# Patient Record
Sex: MALE | Race: WHITE | NOT HISPANIC OR LATINO | Employment: FULL TIME | URBAN - METROPOLITAN AREA
[De-identification: names, ages, dates, MRNs, and addresses within clinical notes are randomized per-mention and may not be internally consistent; named-entity substitution may affect disease eponyms.]

---

## 2018-06-26 ENCOUNTER — EVALUATION (OUTPATIENT)
Dept: PHYSICAL THERAPY | Facility: CLINIC | Age: 53
End: 2018-06-26
Payer: COMMERCIAL

## 2018-06-26 VITALS — SYSTOLIC BLOOD PRESSURE: 120 MMHG | DIASTOLIC BLOOD PRESSURE: 80 MMHG

## 2018-06-26 DIAGNOSIS — S92.002D CLOSED DISPLACED FRACTURE OF LEFT CALCANEUS WITH ROUTINE HEALING, UNSPECIFIED PORTION OF CALCANEUS, SUBSEQUENT ENCOUNTER: Primary | ICD-10-CM

## 2018-06-26 PROCEDURE — 97162 PT EVAL MOD COMPLEX 30 MIN: CPT | Performed by: PHYSICAL THERAPIST

## 2018-06-26 PROCEDURE — G8990 OTHER PT/OT CURRENT STATUS: HCPCS | Performed by: PHYSICAL THERAPIST

## 2018-06-26 PROCEDURE — G8991 OTHER PT/OT GOAL STATUS: HCPCS | Performed by: PHYSICAL THERAPIST

## 2018-06-26 PROCEDURE — 97535 SELF CARE MNGMENT TRAINING: CPT | Performed by: PHYSICAL THERAPIST

## 2018-06-26 NOTE — LETTER
2018    Carly Cowan, [address]    Patient: Lennox Martinez   YOB: 1965   Date of Visit: 2018     Encounter Diagnosis     ICD-10-CM    1  Closed displaced fracture of left calcaneus with routine healing, unspecified portion of calcaneus, subsequent encounter S92 002D        Dear Dr Scot Rodrigues:    Please review the attached Plan of Care from  [address] recent visit  Please verify that you agree therapy should continue by signing the attached document and sending it back to our office  If you have any questions or concerns, please don't hesitate to call  Sincerely,    Paty Verma, PT      Referring Provider:      I certify that I have read the below Plan of Care and certify the need for these services furnished under this plan of treatment while under my care                      Carly Cowan DPM  [address] [place] [address]  VIA Facsimile: [phone]          PT Evaluation     Today's date: 2018  Patient name: Lennox Martinez  : 1965  MRN: [id number]  Referring provider: Sreekanth Reese DPM  Dx:   Encounter Diagnosis     ICD-10-CM    1  Closed displaced fracture of left calcaneus with routine healing, unspecified portion of calcaneus, subsequent encounter S92 002D                   Assessment  Impairments: abnormal gait, abnormal or restricted ROM, activity intolerance, impaired balance, pain with function and weight-bearing intolerance  Functional limitations: currently ambulating in CAM boot L foot with b/l axillary crutches WBAT (began WBAT yesterday per MD instruction); poor tolernace to prolonged standing, currently on short term disability from work  Assessment details: Lennox Martinez is a 46 y o  male who presents s/p Closed displaced fracture of left calcaneus with routine healing, unspecified portion of calcaneus, subsequent encounter  (primary encounter diagnosis)  No further referral appears necessary at this time based upon examination results  Patient has several functional deficits as noted above and wishes to be able to return to PLOF to be able to RTW without restriction  Patient is very  Motivated to make full recovery  Prognosis is good given HEP compliance and PT 2x/wk  Please contact me if you have any questions or recommendations  Thank you for the opportunity to share in  Babita Shannon 's care  Barriers to therapy: Limitations with insurance coverage (limited to 20 visits/year)  Understanding of Dx/Px/POC: good   Prognosis: good    Goals  STGs  1  In 4-6 weeks, patient will demonstrate improved ankle ROM of 5-10*  2  In 4-6 weeks, patient will demonstrated decrease in edema of LLE 1cm or more  3  In 4-6 weeks, patient will be WBAT through LLE in [facility]  LTGs  1  In 6-12 weeks, patient will be independent with ambulation on level surfaces and stairs  2  In 6-12 weeks, patient will be independent with HEP and demonstrate readiness for DC from PT  3  In 10-12 weeks, patient will be able to tolerate standing for 3+ hours and able to RTW modified hours/modified time in standing  Plan  Patient would benefit from: skilled physical therapy  Referral necessary: No  Planned modality interventions: unattended electrical stimulation and cryotherapy  Planned therapy interventions: manual therapy, joint mobilization, balance/weight bearing training, home exercise program, therapeutic exercise, stretching, strengthening and patient education  Frequency: 2x week  Duration in weeks: 6  Plan of Care beginning date: 6/26/2018  Plan of Care expiration date: 8/7/2018  Treatment plan discussed with: patient        Subjective Evaluation    History of Present Illness  Date of onset: 1/17/2018  Date of surgery: 1/23/2018  Mechanism of injury: trauma  Mechanism of injury: Patient reports injury occurred at home when climbing a ladder to remove snow from his roof  Patient reports the ladder slipped out from under him and he fell from the top of the ladder (roof of one story home) onto patio  Patient fractured ankle (lateral malleolus) and fractured calcaneus in 3 places per patient  Patient had surgery on 18  Patient was discharged home after 2 days, was ordered to be in bed for approx 2 months, soft cast for first 3 months and then hard cast for 6 weeks  Patient was then placed in a CAM boot, NW until yesterday (18)  Not a recurrent problem   Pain  Current pain rating: 3  At best pain ratin  At worst pain ratin  Location: L foot and ankle up to mid-shin  Quality: dull ache, sharp and burning  Relieving factors: rest  Aggravating factors: standing and walking    Social Support  Steps to enter house: yes (4 ANISA)  Stairs in house: yes (steps to basement)   Lives 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)  Lives with: spouse and adult children    Employment status: not working (currently employed [employer] full time () - currently on STD will be starting LTD end ;  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))  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, modern jazz)    Patient Goals  Patient goal: to get back to work        Objective     Observations   Left Ankle/Foot   Positive for incision  Additional Observation Details  Scabbing just lateral to lateral malleolus, otherwise incision has healed; no drainage noted    Neurological Testing     Sensation     Ankle/Foot   Left Ankle/Foot   Diminished: light touch  Absent: light touch    Comments   Left light touch: L foot, plantar surface of calcaneus and lateral surface of calcaneus       Active Range of Motion   Left Ankle/Foot   Dorsiflexion (ke): 2 degrees   Plantar flexion: 20 degrees     Strength/Myotome Testing     Left Hip   Planes of Motion   Extension: 4+  Abduction: 4+    Right Hip   Planes of Motion   Extension: 4+  Abduction: 4+    Left Knee   Flexion: 5  Extension: 5    Right Knee   Flexion: 5  Extension: 5    Left Ankle/Foot   Dorsiflexion: 4  Plantar flexion: 4  Inversion: 4  Eversion: 4    Additional Strength Details  MMT in NWB at IE    Swelling   Left Ankle/Foot   Metatarsal heads: 24 5 cm  Figure 8: 54 5 cm  Malleoli: 27 5 cm    Right Ankle/Foot   Metatarsal heads: 23 5 cm  Figure 8: 53 cm  Malleoli: 25 cm    Ambulation   Weight-Bearing Status   Weight-Bearing Status (Left): weight-bearing as tolerated   in CAM  Assistive device used: crutches    General Comments     Ankle/Foot Comments   Moderate pitting edema along lateral aspect of L ankle and foot - provided with light compression sleeve and instructed to wear daily but to remove for sleep      Flowsheet Rows      Most Recent Value   PT/OT G-Codes   Current Score  28   Projected Score  53   FOTO information reviewed  Yes   Assessment Type  Evaluation   G code set  Other PT/OT Primary   Other PT Primary Current Status ()  CL   Other PT Primary Goal Status ()  CK              Daily Treatment Diary   Precautions: s/p calcaneal fracture, WBAT LLE, wean from CAM, DOS 1/23/18    Manual  6/26            L ankle PROM to devi                                                                     Exercise Diary  6/26            HEP instruction (gastroc towel stretch, TB PF/DF, HS stretch) 15'            NuStep             Biodex weight shifting             Biodex maze             Biodex LOS             SLR x 4             Isometric ankle inv/ev             TB ankle PF/DF             Seated HR/TR             Mini squats                                                                                                                                                   Modalities  6/26            CP/stim post tx prn                                         Patient was provided a home exercise program and demonstrated an understanding of exercises    Patient was advised to stop performing home exercise program if symptoms increase or new complaints developed  Verbal understanding demonstrated regarding home exercise program instructions

## 2018-06-26 NOTE — PROGRESS NOTES
PT Evaluation     Today's date: 2018  Patient name: Landis Klinefelter  : 1965  MRN: [id number]  Referring provider: Sergio Hi DPM  Dx:   Encounter Diagnosis     ICD-10-CM    1  Closed displaced fracture of left calcaneus with routine healing, unspecified portion of calcaneus, subsequent encounter S92 002D                   Assessment  Impairments: abnormal gait, abnormal or restricted ROM, activity intolerance, impaired balance, pain with function and weight-bearing intolerance  Functional limitations: currently ambulating in CAM boot L foot with b/l axillary crutches WBAT (began WBAT yesterday per MD instruction); poor tolernace to prolonged standing, currently on short term disability from work  Assessment details: Landis Klinefelter is a 46 y o  male who presents s/p Closed displaced fracture of left calcaneus with routine healing, unspecified portion of calcaneus, subsequent encounter  (primary encounter diagnosis)  No further referral appears necessary at this time based upon examination results  Patient has several functional deficits as noted above and wishes to be able to return to PLOF to be able to RTW without restriction  Patient is very  Motivated to make full recovery  Prognosis is good given HEP compliance and PT 2x/wk  Please contact me if you have any questions or recommendations  Thank you for the opportunity to share in  Yesika Webber 's care  Barriers to therapy: Limitations with insurance coverage (limited to 20 visits/year)  Understanding of Dx/Px/POC: good   Prognosis: good    Goals  STGs  1  In 4-6 weeks, patient will demonstrate improved ankle ROM of 5-10*  2  In 4-6 weeks, patient will demonstrated decrease in edema of LLE 1cm or more  3  In 4-6 weeks, patient will be WBAT through [facility]LE in [facility]  LTGs  1  In 6-12 weeks, patient will be independent with ambulation on level surfaces and stairs  2   In 6-12 weeks, patient will be independent with HEP and demonstrate readiness for DC from PT  3  In 10-12 weeks, patient will be able to tolerate standing for 3+ hours and able to RTW modified hours/modified time in standing  Plan  Patient would benefit from: skilled physical therapy  Referral necessary: No  Planned modality interventions: unattended electrical stimulation and cryotherapy  Planned therapy interventions: manual therapy, joint mobilization, balance/weight bearing training, home exercise program, therapeutic exercise, stretching, strengthening and patient education  Frequency: 2x week  Duration in weeks: 6  Plan of Care beginning date: 2018  Plan of Care expiration date: 2018  Treatment plan discussed with: patient        Subjective Evaluation    History of Present Illness  Date of onset: 2018  Date of surgery: 2018  Mechanism of injury: trauma  Mechanism of injury: Patient reports injury occurred at home when climbing a ladder to remove snow from his roof  Patient reports the ladder slipped out from under him and he fell from the top of the ladder (roof of one story home) onto patio  Patient fractured ankle (lateral malleolus) and fractured calcaneus in 3 places per patient  Patient had surgery on 18  Patient was discharged home after 2 days, was ordered to be in bed for approx 2 months, soft cast for first 3 months and then hard cast for 6 weeks  Patient was then placed in a CAM boot, NWB until yesterday (18)     Not a recurrent problem   Pain  Current pain rating: 3  At best pain ratin  At worst pain ratin  Location: L foot and ankle up to mid-shin  Quality: dull ache, sharp and burning  Relieving factors: rest  Aggravating factors: standing and walking    Social Support  Steps to enter house: yes (4 ANISA)  Stairs in house: yes (steps to basement)   Lives 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)  Lives with: spouse and adult children    Employment status: not working (currently employed [employer] full time () - currently on STD will be starting LTD end of July;  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))  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, modern jazz)    Patient Goals  Patient goal: to get back to work        Objective     Observations   Left Ankle/Foot   Positive for incision  Additional Observation Details  Scabbing just lateral to lateral malleolus, otherwise incision has healed; no drainage noted    Neurological Testing     Sensation     Ankle/Foot   Left Ankle/Foot   Diminished: light touch  Absent: light touch    Comments   Left light touch: L foot, plantar surface of calcaneus and lateral surface of calcaneus       Active Range of Motion   Left Ankle/Foot   Dorsiflexion (ke): 2 degrees   Plantar flexion: 20 degrees     Strength/Myotome Testing     Left Hip   Planes of Motion   Extension: 4+  Abduction: 4+    Right Hip   Planes of Motion   Extension: 4+  Abduction: 4+    Left Knee   Flexion: 5  Extension: 5    Right Knee   Flexion: 5  Extension: 5    Left Ankle/Foot   Dorsiflexion: 4  Plantar flexion: 4  Inversion: 4  Eversion: 4    Additional Strength Details  MMT in NWB at IE    Swelling   Left Ankle/Foot   Metatarsal heads: 24 5 cm  Figure 8: 54 5 cm  Malleoli: 27 5 cm    Right Ankle/Foot   Metatarsal heads: 23 5 cm  Figure 8: 53 cm  Malleoli: 25 cm    Ambulation   Weight-Bearing Status   Weight-Bearing Status (Left): weight-bearing as tolerated   in CAM  Assistive device used: crutches    General Comments     Ankle/Foot Comments   Moderate pitting edema along lateral aspect of L ankle and foot - provided with light compression sleeve and instructed to wear daily but to remove for sleep      Flowsheet Rows      Most Recent Value   PT/OT G-Codes   Current Score  28   Projected Score  53   FOTO information reviewed  Yes   Assessment Type  Evaluation   G code set  Other PT/OT Primary   Other PT Primary Current Status ()  CL   Other PT Primary Goal Status ()  CK              Daily Treatment Diary   Precautions: s/p calcaneal fracture, WBAT LLE, wean from CAM, DOS 1/23/18    Manual  6/26            L ankle PROM to devi                                                                     Exercise Diary  6/26            HEP instruction (gastroc towel stretch, TB PF/DF, HS stretch) 15'            NuStep             Biodex weight shifting             Biodex maze             Biodex LOS             SLR x 4             Isometric ankle inv/ev             TB ankle PF/DF             Seated HR/TR             Mini squats                                                                                                                                                   Modalities  6/26            CP/stim post tx prn                                         Patient was provided a home exercise program and demonstrated an understanding of exercises  Patient was advised to stop performing home exercise program if symptoms increase or new complaints developed  Verbal understanding demonstrated regarding home exercise program instructions

## 2018-06-28 ENCOUNTER — OFFICE VISIT (OUTPATIENT)
Dept: PHYSICAL THERAPY | Facility: CLINIC | Age: 53
End: 2018-06-28
Payer: COMMERCIAL

## 2018-06-28 DIAGNOSIS — S92.002D CLOSED DISPLACED FRACTURE OF LEFT CALCANEUS WITH ROUTINE HEALING, UNSPECIFIED PORTION OF CALCANEUS, SUBSEQUENT ENCOUNTER: Primary | ICD-10-CM

## 2018-06-28 PROCEDURE — 97110 THERAPEUTIC EXERCISES: CPT | Performed by: PHYSICAL THERAPIST

## 2018-06-28 PROCEDURE — 97140 MANUAL THERAPY 1/> REGIONS: CPT | Performed by: PHYSICAL THERAPIST

## 2018-06-28 NOTE — PROGRESS NOTES
Daily Note     Today's date: 2018  Patient name: Sheila Lowry  : 1965  MRN: [id number]  Referring provider: Robert Lagos DPM  Dx:   Encounter Diagnosis     ICD-10-CM    1  Closed displaced fracture of left calcaneus with routine healing, unspecified portion of calcaneus, subsequent encounter S92 002D                   Subjective: "I was actually feeling a little better after you gave me some exercises but I'm holding back because I don't want to over-do it "      Objective: See treatment diary below  Daily Treatment Diary   Precautions: s/p calcaneal fracture, WBAT LLE, wean from CAM, DOS 18    Manual        L ankle PROM to devi  15'                                          Exercise Diary        HEP instruction (gastroc towel stretch, TB PF/DF, HS stretch) 15'       NuStep  L1, 10'      Biodex weight shifting  10"x10, // bars in CAM boot      Biodex maze        Biodex LOS        SLR x 4  2x10 ea      Isometric ankle inv/ev  5" 10x ea      TB ankle PF/DF        Seated HR/TR  20x ea      Mini squats  2x10, 5" ea                                                                                          Modalities        CP/stim post tx prn  Deferred to home                            Assessment: Tolerated treatment well  All standing exercises were performed with CAM boot on  Recommended patient bring sneaker for NV to begin WB out of CAM to tolerance  Patient demonstrated fatigue post treatment, exhibited good technique with therapeutic exercises and would benefit from continued PT      Plan: Continue per plan of care  Progress treatment as tolerated

## 2018-07-03 ENCOUNTER — OFFICE VISIT (OUTPATIENT)
Dept: PHYSICAL THERAPY | Facility: CLINIC | Age: 53
End: 2018-07-03
Payer: COMMERCIAL

## 2018-07-03 DIAGNOSIS — S92.002D CLOSED DISPLACED FRACTURE OF LEFT CALCANEUS WITH ROUTINE HEALING, UNSPECIFIED PORTION OF CALCANEUS, SUBSEQUENT ENCOUNTER: Primary | ICD-10-CM

## 2018-07-03 PROCEDURE — 97116 GAIT TRAINING THERAPY: CPT

## 2018-07-03 PROCEDURE — 97140 MANUAL THERAPY 1/> REGIONS: CPT

## 2018-07-03 PROCEDURE — 97110 THERAPEUTIC EXERCISES: CPT

## 2018-07-03 NOTE — PROGRESS NOTES
Daily Note     Today's date: 7/3/2018  Patient name: Sheila Lowry  : 1965  MRN: [id number]  Referring provider: Robert Lagos DPM  Dx:   Encounter Diagnosis     ICD-10-CM    1  Closed displaced fracture of left calcaneus with routine healing, unspecified portion of calcaneus, subsequent encounter S92 002D                   Subjective: Pt reports that he is doing his HEP at home  He reports his ankle is "sore but there is no pain "      Objective: See treatment diary below  Daily Treatment Diary   Precautions: s/p calcaneal fracture, WBAT LLE, wean from CAM, DOS 18    Manual   7/3     L ankle PROM to devi  15' 15'                                         Exercise Diary  6/26 6/28 7/3     HEP instruction (gastroc towel stretch, TB PF/DF, HS stretch) 15'       NuStep  L1, 10' L1 10'     Biodex weight shifting  10"x10, // bars in CAM boot 10" on/10" off in Cam boot     Biodex maze        Biodex LOS        SLR x 4  2x10 ea 2x10     Isometric ankle inv/ev  5" 10x ea 5" 10x     TB ankle PF/DF   Green 2x10      Seated HR/TR  20x ea 20x ea     Mini squats  2x10, 5" ea 2x10 5" ea     Towel slides Iv/Ev   1' x1 ea      Gait training    Bilateral Axillary crutches, no boot 150'x2  Single axillary crutch 50'x1                                                                         Modalities   7/3     CP/stim post tx prn  Deferred to home NP                         Assessment: Tolerated treatment well  Initiated towel slides and TB exercises this date with no exacerbation of pain  Gait training performed without CAM boot this date  B/L Axillary crutches 150'x2 and a Single axillary crutch 50'x1  Patient demonstrated fatigue post treatment and would benefit from continued PT      Plan: Continue per plan of care  Progress treatment as tolerated  Wean off CAM bootANIBAL CAM boot next week

## 2018-07-05 ENCOUNTER — OFFICE VISIT (OUTPATIENT)
Dept: PHYSICAL THERAPY | Facility: CLINIC | Age: 53
End: 2018-07-05
Payer: COMMERCIAL

## 2018-07-05 DIAGNOSIS — S92.002D CLOSED DISPLACED FRACTURE OF LEFT CALCANEUS WITH ROUTINE HEALING, UNSPECIFIED PORTION OF CALCANEUS, SUBSEQUENT ENCOUNTER: Primary | ICD-10-CM

## 2018-07-05 PROCEDURE — 97116 GAIT TRAINING THERAPY: CPT

## 2018-07-05 PROCEDURE — 97110 THERAPEUTIC EXERCISES: CPT

## 2018-07-05 PROCEDURE — 97140 MANUAL THERAPY 1/> REGIONS: CPT

## 2018-07-05 NOTE — PROGRESS NOTES
Daily Note     Today's date: 2018  Patient name: Gregorio Jara  : 1965  MRN: [id number]  Referring provider: Tiff Driver DPM  Dx:   Encounter Diagnosis     ICD-10-CM    1  Closed displaced fracture of left calcaneus with routine healing, unspecified portion of calcaneus, subsequent encounter S92 002D                   Subjective: Pt reports that he had no pain after walking without the CAM boot  Pt denies pain now      Objective: See treatment diary below    Precautions: s/p calcaneal fracture, WBAT LLE, wean from CAM, DOS 18    Manual  6/26 6/28 7/3 7/5     L ankle PROM to devi  15' 15' 15'                                        Exercise Diary  6/26 6/28 7/3 7/5    HEP instruction (gastroc towel stretch, TB PF/DF, HS stretch) 15'       NuStep  L1, 10' L1 10' L3 10' no boot    Biodex weight shifting  10"x10, // bars in CAM boot 10" on/10" off in Cam boot 10" on/10" off in Cam boot    Biodex maze        Biodex LOS        SLR x 4  2x10 ea 2x10 2x10    Isometric ankle inv/ev  5" 10x ea 5" 10x 5" x10    TB ankle PF/DF   Green 2x10  Green 3x10     Seated HR/TR  20x ea 20x ea 20x    Mini squats  2x10, 5" ea 2x10 5" ea 2x10 5"    Towel slides Iv/Ev   1' x1 ea  1' x1    Gait training    Bilateral Axillary crutches, no boot 150'x2  Single axillary crutch 50'x1 Bilateral axillary crutches 300' x1 with no CAM boot  Modalities  6/26 6/28 7/3 7/5     CP/stim post tx prn  Deferred to home NP NP                      Assessment: Tolerated treatment well  Pt is ambulating without the CAM boot during exercises  Patient demonstrated fatigue post treatment, exhibited good technique with therapeutic exercises and would benefit from continued PT  CAM boot D/C for household ambulation with bilateral axillary crutches  Plan: Continue per plan of care  Progress treatment as tolerated

## 2018-07-10 ENCOUNTER — OFFICE VISIT (OUTPATIENT)
Dept: PHYSICAL THERAPY | Facility: CLINIC | Age: 53
End: 2018-07-10
Payer: COMMERCIAL

## 2018-07-10 DIAGNOSIS — S92.002D CLOSED DISPLACED FRACTURE OF LEFT CALCANEUS WITH ROUTINE HEALING, UNSPECIFIED PORTION OF CALCANEUS, SUBSEQUENT ENCOUNTER: Primary | ICD-10-CM

## 2018-07-10 PROCEDURE — 97110 THERAPEUTIC EXERCISES: CPT | Performed by: PHYSICAL THERAPIST

## 2018-07-10 PROCEDURE — 97140 MANUAL THERAPY 1/> REGIONS: CPT | Performed by: PHYSICAL THERAPIST

## 2018-07-10 NOTE — PROGRESS NOTES
Daily Note     Today's date: 7/10/2018  Patient name: Humphrey Reis  : 1965  MRN: [id number]  Referring provider: Mykel Gu DPM  Dx:   Encounter Diagnosis     ICD-10-CM    1  Closed displaced fracture of left calcaneus with routine healing, unspecified portion of calcaneus, subsequent encounter S92 002D                   Subjective: "I'm doing pretty good  I use one crutch around the house all the time  I only wear the boot when I go to a place like [facility] where I don't want people to run into me "      Objective: See treatment diary below  Precautions: s/p calcaneal fracture, WBAT LLE, wean from CAM, DOS 18    Manual  6/26 6/28 7/3 7/5  7/10   L ankle PROM to devi  15' 15' 15' 15'                                       Exercise Diary  6/26 6/28 7/3 7/5 7/10   HEP instruction (gastroc towel stretch, TB PF/DF, HS stretch) 15'       NuStep  L1, 10' L1 10' L3 10' no boot L4 10' sneaker   Biodex weight shifting  10"x10, // bars in CAM boot 10" on/10" off in Cam boot 10" on/10" off in Cam boot 10" on/off in sneaker, 3 mins   Biodex maze     Medium, static, 3x   Biodex LOS        SLR x 4  2x10 ea 2x10 2x10 3x10 ea, 1#   Isometric ankle inv/ev  5" 10x ea 5" 10x 5" x10    TB ankle PF/DF   Green 2x10  Green 3x10  Green 3x10   Seated HR/TR  20x ea 20x ea 20x Standing 20x ea   Mini squats  2x10, 5" ea 2x10 5" ea 2x10 5" 3x10, 5" on Biodex for equal WB   Towel slides Iv/Ev   1' x1 ea  1' x1    Gait training    Bilateral Axillary crutches, no boot 150'x2  Single axillary crutch 50'x1 Bilateral axillary crutches 300' x1 with no CAM boot  One crutch, sneaker, 450 feet                                                                       Modalities  6/26 6/28 7/3 7/5  7/10   CP/stim post tx prn  Deferred to home NP NP Deferred to home                       Assessment: Tolerated treatment well  Performed entire treatment without CAM boot today  Tolerated progressions well   Progressed gait training from 2 crutches to 1 crutch with good tolerance  Reported some anterior ankle pain post gait training  Patient demonstrated fatigue post treatment, exhibited good technique with therapeutic exercises and would benefit from continued PT      Plan: Continue per plan of care  Progress treatment as tolerated

## 2018-07-13 ENCOUNTER — OFFICE VISIT (OUTPATIENT)
Dept: PHYSICAL THERAPY | Facility: CLINIC | Age: 53
End: 2018-07-13
Payer: COMMERCIAL

## 2018-07-13 DIAGNOSIS — S92.002D CLOSED DISPLACED FRACTURE OF LEFT CALCANEUS WITH ROUTINE HEALING, UNSPECIFIED PORTION OF CALCANEUS, SUBSEQUENT ENCOUNTER: Primary | ICD-10-CM

## 2018-07-13 PROCEDURE — 97140 MANUAL THERAPY 1/> REGIONS: CPT | Performed by: PHYSICAL THERAPIST

## 2018-07-13 PROCEDURE — 97110 THERAPEUTIC EXERCISES: CPT | Performed by: PHYSICAL THERAPIST

## 2018-07-13 NOTE — PROGRESS NOTES
Daily Note     Today's date: 2018  Patient name: Chante Hampton  : 1965  MRN: [id number]  Referring provider: Neris Bernardo DPM  Dx:   Encounter Diagnosis     ICD-10-CM    1  Closed displaced fracture of left calcaneus with routine healing, unspecified portion of calcaneus, subsequent encounter S92 002D                   Subjective: "I did the college orientation over the last two days with my daughter  Not really too sore "      Objective: See treatment diary below  Precautions: s/p calcaneal fracture, WBAT LLE, wean from CAM, DOS 18    Manual  7/13 6/28 7/3 7/5  7/10   L ankle PROM to devi  15' 15' 15' 15'                                       Exercise Diary  7/13 6/28 7/3 7/5 7/10   HEP instruction (gastroc towel stretch, TB PF/DF, HS stretch)        NuStep L4 10' sneaker L1, 10' L1 10' L3 10' no boot L4 10' sneaker   Biodex weight shifting 10" on/off in sneaker, 3 mins 10"x10, // bars in CAM boot 10" on/10" off in Cam boot 10" on/10" off in Cam  10" on/off in sneaker, 3 mins   Biodex maze Medium, static, 3x    Medium, static, 3x   Biodex LOS        SLR x 4 3x10 ea, 1# 2x10 ea 2x10 2x10 3x10 ea, 1#   Isometric ankle inv/ev DC to TB 5" 10x ea 5" 10x 5" x10    TB ankle PF/DF 4 way  Green  3x10  Green 2x10  Green 3x10  Green 3x10   Seated HR/TR Standing 20x ea 20x ea 20x ea 20x Standing 20x ea   Mini squats 3x10, 5" in sneaker 2x10, 5" ea 2x10 5" ea 2x10 5" 3x10, 5" on Biodex for equal WB   Towel slides Iv/Ev DC  1' x1 ea  1' x1    Gait training    Bilateral Axillary crutches, no boot 150'x2  Single axillary crutch 50'x1 Bilateral axillary crutches 300' x1 with no CAM boot  One crutch, sneaker, 450 feet   Standing hip 3 way 10x ea                                                                   Modalities  7/13 6/28 7/3 7/5  7/10   CP/stim post tx prn  Deferred to home NP NP Deferred to home                         Assessment: Tolerated treatment well  Tolerance to WB is improving   Added standing hip 3 way exercise without complaints  Patient demonstrated fatigue post treatment, exhibited good technique with therapeutic exercises and would benefit from continued PT      Plan: Continue per plan of care  Progress treatment as tolerated

## 2018-07-17 ENCOUNTER — OFFICE VISIT (OUTPATIENT)
Dept: PHYSICAL THERAPY | Facility: CLINIC | Age: 53
End: 2018-07-17
Payer: COMMERCIAL

## 2018-07-17 DIAGNOSIS — S92.002D CLOSED DISPLACED FRACTURE OF LEFT CALCANEUS WITH ROUTINE HEALING, UNSPECIFIED PORTION OF CALCANEUS, SUBSEQUENT ENCOUNTER: Primary | ICD-10-CM

## 2018-07-17 PROCEDURE — 97140 MANUAL THERAPY 1/> REGIONS: CPT

## 2018-07-17 PROCEDURE — 97110 THERAPEUTIC EXERCISES: CPT

## 2018-07-17 PROCEDURE — 97116 GAIT TRAINING THERAPY: CPT

## 2018-07-17 NOTE — PROGRESS NOTES
Daily Note     Today's date: 2018  Patient name: Chante Hampton  : 1965  MRN: [id number]  Referring provider: Neris Bernardo DPM  Dx:   Encounter Diagnosis     ICD-10-CM    1  Closed displaced fracture of left calcaneus with routine healing, unspecified portion of calcaneus, subsequent encounter S92 002D                   Subjective: Pt reports that he is walking at home with one crutch 50% of the time  Objective: See treatment diary below  Precautions: s/p calcaneal fracture, WBAT LLE, wean from CAM, DOS 18    Manual  7/13 7/17 7/3 7/5  7/10   L ankle PROM to devi  15' 15' 15' 15'                                       Exercise Diary  7/13 7/17 7/3 7/5 7/10   HEP instruction (gastroc towel stretch, TB PF/DF, HS stretch)        NuStep L4 10' sneaker L5, 10' L1 10' L3 10' no boot L4 10' sneaker   Biodex weight shifting 10" on/off in sneaker, 3 mins 10" on/off in sneaker, 3 mins 10" on/10" off in Cam boot 10" on/10" off in Cam  10" on/off in sneaker, 3 mins   Biodex maze Medium, static, 3x Medium, L12, 3x   Medium, static, 3x   Biodex LOS  Medium, L12, 3x      SLR x 4 3x10 ea, 1# 3x10 ea, 1 5# 2x10 2x10 3x10 ea, 1#   Isometric ankle inv/ev DC to TB  5" 10x 5" x10    TB ankle PF/DF 4 way  Green  3x10 4 way  Green  3x10 Green 2x10  Green 3x10  Green 3x10   Seated HR/TR Standing 20x ea Standing 30x ea 20x ea 20x Standing 20x ea   Mini squats 3x10, 5" in sneaker 3x10, 5" in sneaker 2x10 5" ea 2x10 5" 3x10, 5" on Biodex for equal WB   Towel slides Iv/Ev DC  1' x1 ea  1' x1    Gait training   One crutch, sneaker, >500 feet Bilateral Axillary crutches, no boot 150'x2  Single axillary crutch 50'x1 Bilateral axillary crutches 300' x1 with no CAM boot   One crutch, sneaker, 450 feet   Standing hip 3 way 10x ea 2x10 ea                                                                  Modalities  7/13 7/17 7/3 7/5  7/10   CP/stim post tx prn  Deferred to home NP NP Deferred to home Assessment: Tolerated treatment well  Increased reps and weight on multiple exercises  Pt able to ambulate with single axillary crutch with no LOB  Pt sent home with instructions to use single axillary crutch as much as he can  Patient exhibited good technique with therapeutic exercises and would benefit from continued PT      Plan: Continue per plan of care  Progress treatment as tolerated

## 2018-07-19 ENCOUNTER — OFFICE VISIT (OUTPATIENT)
Dept: PHYSICAL THERAPY | Facility: CLINIC | Age: 53
End: 2018-07-19
Payer: COMMERCIAL

## 2018-07-19 DIAGNOSIS — S92.002D CLOSED DISPLACED FRACTURE OF LEFT CALCANEUS WITH ROUTINE HEALING, UNSPECIFIED PORTION OF CALCANEUS, SUBSEQUENT ENCOUNTER: Primary | ICD-10-CM

## 2018-07-19 PROCEDURE — 97110 THERAPEUTIC EXERCISES: CPT | Performed by: PHYSICAL THERAPIST

## 2018-07-19 PROCEDURE — 97535 SELF CARE MNGMENT TRAINING: CPT | Performed by: PHYSICAL THERAPIST

## 2018-07-19 PROCEDURE — 97140 MANUAL THERAPY 1/> REGIONS: CPT | Performed by: PHYSICAL THERAPIST

## 2018-07-19 NOTE — PROGRESS NOTES
Daily Note     Today's date: 2018  Patient name: Chelsea Rick  : 1965  MRN: [id number]  Referring provider: Steffany Ferguson DPM  Dx:   Encounter Diagnosis     ICD-10-CM    1  Closed displaced fracture of left calcaneus with routine healing, unspecified portion of calcaneus, subsequent encounter S92 002D        Start Time: 0800  Stop Time: 0900  Total time in clinic (min): 60 minutes    Subjective: Pt reports he is a little sore today, but notes that he is able to perform more activities as he is progressing out of the CAM boot  Objective: See treatment diary below  Precautions: s/p calcaneal fracture, WBAT LLE, wean from CAM, DOS 18     Manual  7/13 7/17 7/19 7/5  7/10   L ankle PROM to devi   15' 15' 15' 15'                                                                 Exercise Diary  7/13 7/17 7/19 7/5 7/10   HEP instruction (gastroc towel stretch, TB PF/DF, HS stretch)             NuStep L4 10' sneaker L5, 10' L5 10' L3 10' no boot L4 10' sneaker   Biodex weight shifting 10" on/off in sneaker, 3 mins 10" on/off in sneaker, 3 mins 10" on/off in sneaker  3 mins 10" on/10" off in Cam  10" on/off in sneaker, 3 mins   Biodex maze Medium, static, 3x Medium, L12, 3x Medium  L12, 3x   Medium, static, 3x   Biodex LOS   Medium, L12, 3x Medium  L12, 3x       SLR x 4 3x10 ea, 1# 3x10 ea, 1 5# 3x10 ea 1 5# 2x10 3x10 ea, 1#   Isometric ankle inv/ev DC to TB    5" x10     TB ankle PF/DF 4 way  Green  3x10 4 way  Green  3x10 4 way  Green  3x10 Green 3x10  Green 3x10   Seated HR/TR Standing 20x ea Standing 30x ea Standing 30x ea 20x Standing 20x ea   Mini squats 3x10, 5" in sneaker 3x10, 5" in sneaker 3x10 5" in sneaker 2x10 5" 3x10, 5" on Biodex for equal WB   Towel slides Iv/Ev DC    1' x1     Gait training    One crutch, sneaker, >500 feet One crutch, sneaker  >500 feet Bilateral axillary crutches 300' x1 with no CAM boot   One crutch, sneaker, 450 feet   Standing hip 3 way 10x ea 2x10 ea 2x10 ea                                                                                                               Modalities  7/13 7/17 7/19 7/5  7/10   CP/stim post tx prn   Deferred to home NP NP Deferred to home                                     Assessment: Tolerated treatment well  Patient exhibited good technique with therapeutic exercises and would benefit from continued PT   Pt noted minor fatigue post treatment, however, no increased pain  Pt given additional HEP consisting of HR/TR, SLRs, and mini squats  Plan: Continue per plan of care  Progress treatment as tolerated

## 2018-07-24 ENCOUNTER — OFFICE VISIT (OUTPATIENT)
Dept: PHYSICAL THERAPY | Facility: CLINIC | Age: 53
End: 2018-07-24
Payer: COMMERCIAL

## 2018-07-24 DIAGNOSIS — S92.002D CLOSED DISPLACED FRACTURE OF LEFT CALCANEUS WITH ROUTINE HEALING, UNSPECIFIED PORTION OF CALCANEUS, SUBSEQUENT ENCOUNTER: Primary | ICD-10-CM

## 2018-07-24 PROCEDURE — 97140 MANUAL THERAPY 1/> REGIONS: CPT | Performed by: PHYSICAL THERAPIST

## 2018-07-24 PROCEDURE — 97110 THERAPEUTIC EXERCISES: CPT | Performed by: PHYSICAL THERAPIST

## 2018-07-24 NOTE — PROGRESS NOTES
Daily Note     Today's date: 2018  Patient name: Chante Hampton  : 1965  MRN: [id number]  Referring provider: Neris Bernardo DPM  Dx:   Encounter Diagnosis     ICD-10-CM    1  Closed displaced fracture of left calcaneus with routine healing, unspecified portion of calcaneus, subsequent encounter S92 002D                   Subjective: Patient reports he is doing well  Occasionally ambulates around kitchen without any crutches, but mainly uses one crutch at this time        Objective: See treatment diary below  Precautions: s/p calcaneal fracture, WBAT LLE, wean from CAM, DOS 18     Manual  7/13 7/17 7/19 7/24  7/10   L ankle PROM to devi   15' 15' 15' 15'                                                                 Exercise Diary  7/13 7/17 7/19 7/24 7/10   HEP instruction (gastroc towel stretch, TB PF/DF, HS stretch)             NuStep L4 10' sneaker L5, 10' L5 10' L6, 10' L4 10' sneaker   Biodex weight shifting 10" on/off in sneaker, 3 mins 10" on/off in sneaker, 3 mins 10" on/off in sneaker  3 mins  10" on/off in sneaker, 3 mins   Biodex maze Medium, static, 3x Medium, L12, 3x Medium  L12, 3x  Medium  L11, 3x Medium, static, 3x   Biodex LOS   Medium, L12, 3x Medium  L12, 3x  Medium  L11, 3x     SLR x 4 3x10 ea, 1# 3x10 ea, 1 5# 3x10 ea 1 5# 3x10 ea, 2# 3x10 ea, 1#   TB ankle PF/DF 4 way  Green  3x10 4 way  Green  3x10 4 way  Green  3x10 4 way, blue, 3x10 Green 3x10   HR/TR Standing 20x ea Standing 30x ea Standing 30x ea 30x ea Standing 20x ea   Mini squats 3x10, 5" in sneaker 3x10, 5" in sneaker 3x10 5" in sneaker 3x10 5" 3x10, 5" on Biodex for equal WB   Gait training    One crutch, sneaker, >500 feet One crutch, sneaker  >500 feet On TM, 1 5-2 5 mph, both UE assist on handrails  x5 mins One crutch, sneaker, 450 feet   Standing hip 3 way 10x ea 2x10 ea 2x10 ea  3x10 ea                                                                                                             Modalities   7/17 7/19 7/24 7/10   CP/stim post tx prn   Deferred to home NP NP Deferred to home                                   Assessment: Tolerated treatment well  Making steady progress toward goals  Becoming more tolerant to WB  Initiated GT on TM this date and utilized mirror for visual feedback to avoid lateral lean  Unequal step length noted, cues to equalize length  Patient demonstrated fatigue post treatment, exhibited good technique with therapeutic exercises and would benefit from continued PT      Plan: Continue per plan of care  Progress treatment as tolerated

## 2018-07-26 ENCOUNTER — OFFICE VISIT (OUTPATIENT)
Dept: PHYSICAL THERAPY | Facility: CLINIC | Age: 53
End: 2018-07-26
Payer: COMMERCIAL

## 2018-07-26 DIAGNOSIS — S92.002D CLOSED DISPLACED FRACTURE OF LEFT CALCANEUS WITH ROUTINE HEALING, UNSPECIFIED PORTION OF CALCANEUS, SUBSEQUENT ENCOUNTER: Primary | ICD-10-CM

## 2018-07-26 PROCEDURE — 97116 GAIT TRAINING THERAPY: CPT

## 2018-07-26 PROCEDURE — 97140 MANUAL THERAPY 1/> REGIONS: CPT

## 2018-07-26 PROCEDURE — 97110 THERAPEUTIC EXERCISES: CPT

## 2018-07-26 NOTE — PROGRESS NOTES
Daily Note     Today's date: 2018  Patient name: Jasper Banuelos  : 1965  MRN: [id number]  Referring provider: Nette Islas DPM  Dx:   Encounter Diagnosis     ICD-10-CM    1  Closed displaced fracture of left calcaneus with routine healing, unspecified portion of calcaneus, subsequent encounter S92 002D                   Subjective: Pt denies all pain         Objective: See treatment diary below  Precautions: s/p calcaneal fracture, WBAT LLE, wean from CAM, DOS 18     Manual     L ankle PROM to devi   15' 15' 15' 15'                                                                 Exercise Diary     HEP instruction (gastroc towel stretch, TB PF/DF, HS stretch)             NuStep L4 10' sneaker L5, 10' L5 10' L6, 10' L6, 10'   Biodex weight shifting 10" on/off in sneaker, 3 mins 10" on/off in sneaker, 3 mins 10" on/off in sneaker  3 mins     Biodex maze Medium, static, 3x Medium, L12, 3x Medium  L12, 3x  Medium  L11, 3x Medium  L11, 3x   Biodex LOS   Medium, L12, 3x Medium  L12, 3x  Medium  L11, 3x  Medium  L11, 3x   SLR x 4 3x10 ea, 1# 3x10 ea, 1 5# 3x10 ea 1 5# 3x10 ea, 2# 3x10 ea, 2#   TB ankle PF/DF 4 way  Green  3x10 4 way  Green  3x10 4 way  Green  3x10 4 way, blue, 3x10 4 way, blue, 3x10   HR/TR Standing 20x ea Standing 30x ea Standing 30x ea 30x ea 30x ea   Mini squats 3x10, 5" in sneaker 3x10, 5" in sneaker 3x10 5" in sneaker 3x10 5" 3x10 5"   Gait training    One crutch, sneaker, >500 feet One crutch, sneaker  >500 feet On TM, 1 5-2 5 mph, both UE assist on handrails  x5 mins On TM, 1 5-2 5 mph, both UE assist on handrails  x5 mins   Standing hip 3 way 10x ea 2x10 ea 2x10 ea  3x10 ea  3x10 ea                                                                                                           Modalities  7/13 7/17 7/19 7/24 7/26   CP/stim post tx prn   Deferred to home NP NP NP                                   Assessment: Tolerated treatment well  Pt continues to walk with an antalgic gait  Continued to gait train on Tmill with cueing for proper gait brianne  Patient exhibited good technique with therapeutic exercises and would benefit from continued PT      Plan: Continue per plan of care  Progress treatment as tolerated

## 2018-07-31 ENCOUNTER — OFFICE VISIT (OUTPATIENT)
Dept: PHYSICAL THERAPY | Facility: CLINIC | Age: 53
End: 2018-07-31
Payer: COMMERCIAL

## 2018-07-31 DIAGNOSIS — S92.002D CLOSED DISPLACED FRACTURE OF LEFT CALCANEUS WITH ROUTINE HEALING, UNSPECIFIED PORTION OF CALCANEUS, SUBSEQUENT ENCOUNTER: Primary | ICD-10-CM

## 2018-07-31 PROCEDURE — 97140 MANUAL THERAPY 1/> REGIONS: CPT | Performed by: PHYSICAL THERAPIST

## 2018-07-31 PROCEDURE — 97110 THERAPEUTIC EXERCISES: CPT | Performed by: PHYSICAL THERAPIST

## 2018-07-31 NOTE — PROGRESS NOTES
Daily Note     Today's date: 2018  Patient name: Sarah Beth East  : 1965  MRN: [id number]  Referring provider: Radha Gil DPM  Dx:   Encounter Diagnosis     ICD-10-CM    1  Closed displaced fracture of left calcaneus with routine healing, unspecified portion of calcaneus, subsequent encounter S92 002D                   Subjective: "You're going to be mad at me  Instead of walking on the treadmill I mowed my lawn  But I only did a small section and avoided any areas where it was uneven  I was out for about a half hour " Patient feeling good, reports the worst of his pain is in his heel and along the scar        Objective: See treatment diary below  Precautions: s/p calcaneal fracture, WBAT LLE, wean from CAM, DOS 18     Manual  731    L ankle PROM to devi  15' 15' 15' 15' 15'                                                                 Exercise Diary     HEP instruction (gastroc towel stretch, TB PF/DF, HS stretch)             NuStep L7,10'  L5, 10' L5 10' L6, 10' L6, 10'   Biodex weight shifting DC 10" on/off in sneaker, 3 mins 10" on/off in sneaker  3 mins     Biodex maze Difficult, L10,  3x Medium, L12, 3x Medium  L12, 3x  Medium  L11, 3x Medium  L11, 3x   Biodex LOS  Medium, L10, 3x Medium, L12, 3x Medium  L12, 3x  Medium  L11, 3x  Medium  L11, 3x   SLR x 4 3x10 ea, 2# 3x10 ea, 1 5# 3x10 ea 1 5# 3x10 ea, 2# 3x10 ea, 2#   TB ankle 4 way Blue  3x10  4 way  Green  3x10 4 way  Green  3x10 4 way, blue, 3x10 4 way, blue, 3x10   HR/TR - standing 30x ea Standing 30x ea Standing 30x ea 30x ea 30x ea   Mini squats 3x10, 5"  3x10, 5" in sneaker 3x10 5" in sneaker 3x10 5" 3x10 5"   Gait training  on TM, 1 5-2 5 mph, both UE assist on handrails  x8 mins One crutch, sneaker, >500 feet One crutch, sneaker  >500 feet On TM, 1 5-2 5 mph, both UE assist on handrails  x5 mins On TM, 1 5-2 5 mph, both UE assist on handrails  x5 mins   Standing hip 3 way 2#, 3x10 ea 2x10 ea 2x10 ea  3x10 ea  3x10 ea    SLS  30" x4                                                                                                     Modalities  7/30 7/17 7/19 7/24 7/26   CP/stim post tx prn   Deferred to home NP NP NP                                   Assessment: Tolerated treatment well  Added SLS to improve balance and stability through L LE  Patient demonstrated fatigue post treatment, exhibited good technique with therapeutic exercises and would benefit from continued PT      Plan: Continue per plan of care  Progress treatment as tolerated

## 2018-08-02 ENCOUNTER — OFFICE VISIT (OUTPATIENT)
Dept: PHYSICAL THERAPY | Facility: CLINIC | Age: 53
End: 2018-08-02
Payer: COMMERCIAL

## 2018-08-02 DIAGNOSIS — S92.002D CLOSED DISPLACED FRACTURE OF LEFT CALCANEUS WITH ROUTINE HEALING, UNSPECIFIED PORTION OF CALCANEUS, SUBSEQUENT ENCOUNTER: Primary | ICD-10-CM

## 2018-08-02 PROCEDURE — 97110 THERAPEUTIC EXERCISES: CPT

## 2018-08-02 PROCEDURE — 97140 MANUAL THERAPY 1/> REGIONS: CPT

## 2018-08-02 NOTE — PROGRESS NOTES
Daily Note     Today's date: 2018  Patient name: Sebastian Arriola  : 1965  MRN: [id number]  Referring provider: Gabino Belle DPM  Dx:   Encounter Diagnosis     ICD-10-CM    1  Closed displaced fracture of left calcaneus with routine healing, unspecified portion of calcaneus, subsequent encounter S92 002D                   Subjective: Pt reports that he walked his dog yesterday  Pt is performing his HEP        Objective: See treatment diary below  Precautions: s/p calcaneal fracture, WBAT LLE, wean from CAM, DOS 18     Manual  731    L ankle PROM to devi  15' 15' 15' 15' 15'                                                                 Exercise Diary     HEP instruction (gastroc towel stretch, TB PF/DF, HS stretch)             NuStep L7,10'  L7, 10' L5 10' L6, 10' L6, 10'   Biodex weight shifting DC  10" on/off in sneaker  3 mins     Biodex maze Difficult, L10,  3x Difficult, L10,  3x Medium  L12, 3x  Medium  L11, 3x Medium  L11, 3x   Biodex LOS  Medium, L10, 3x Medium, L10, 3x Medium  L12, 3x  Medium  L11, 3x  Medium  L11, 3x   SLR x 4 3x10 ea, 2# 3x10 ea, 2# 3x10 ea 1 5# 3x10 ea, 2# 3x10 ea, 2#   TB ankle 4 way Blue  3x10  Blue  3x10  4 way  Green  3x10 4 way, blue, 3x10 4 way, blue, 3x10   HR/TR - standing 30x ea 30x ea Standing 30x ea 30x ea 30x ea   Mini squats 3x10, 5"  3x10, 5"  3x10 5" in sneaker 3x10 5" 3x10 5"   Gait training  on TM, 1 5-2 5 mph, both UE assist on handrails  x8 mins on TM, 1 5-2 5 mph, both UE assist on handrails  x8 minst One crutch, sneaker  >500 feet On TM, 1 5-2 5 mph, both UE assist on handrails  x5 mins On TM, 1 5-2 5 mph, both UE assist on handrails  x5 mins   Standing hip 3 way 2#, 3x10 ea 2# 3x10 ea 2x10 ea  3x10 ea  3x10 ea    SLS  30" x4  30" x4                                                                                                   Modalities     CP/stim post tx prn   Deferred to home NP NP NP                                   Assessment: Tolerated treatment well  Pt continues to ambulate with a single axillary crutch  Patient demonstrated fatigue post treatment, exhibited good technique with therapeutic exercises and would benefit from continued PT      Plan: Continue per plan of care  Progress note during next visit

## 2018-08-07 ENCOUNTER — OFFICE VISIT (OUTPATIENT)
Dept: PHYSICAL THERAPY | Facility: CLINIC | Age: 53
End: 2018-08-07
Payer: COMMERCIAL

## 2018-08-07 DIAGNOSIS — S92.002D CLOSED DISPLACED FRACTURE OF LEFT CALCANEUS WITH ROUTINE HEALING, UNSPECIFIED PORTION OF CALCANEUS, SUBSEQUENT ENCOUNTER: Primary | ICD-10-CM

## 2018-08-07 PROCEDURE — 97110 THERAPEUTIC EXERCISES: CPT | Performed by: PHYSICAL THERAPIST

## 2018-08-07 PROCEDURE — 97140 MANUAL THERAPY 1/> REGIONS: CPT | Performed by: PHYSICAL THERAPIST

## 2018-08-07 NOTE — PROGRESS NOTES
Daily Note     Today's date: 2018  Patient name: Maxime Cordero  : 1965  MRN: [id number]  Referring provider: Mainor Santiago DPM  Dx:   Encounter Diagnosis     ICD-10-CM    1  Closed displaced fracture of left calcaneus with routine healing, unspecified portion of calcaneus, subsequent encounter S92 002D                   Subjective: Patient reports he has been walking around even more without crutches  Reports he has been having "burning" pain on the bottoms of both of his feet, usually when he is off of his feet        Objective: See treatment diary below  Precautions: s/p calcaneal fracture, WBAT LLE, wean from CAM, DOS 18     Manual  731    L ankle PROM to devi  15' 15' 15' 15' 15'                                                                 Exercise Diary     HEP instruction (gastroc towel stretch, TB PF/DF, HS stretch)             NuStep L7,10'  L7, 10' L8, 10' L6, 10' L6, 10'   Biodex maze Difficult, L10,  3x Difficult, L10,  3x Difficult, L10,  3x  Medium  L11, 3x Medium  L11, 3x   Biodex LOS  Medium, L10, 3x Medium, L10, 3x Difficult  L10, 3x  Medium  L11, 3x  Medium  L11, 3x   SLR x 4 3x10 ea, 2# 3x10 ea, 2# 3x10 ea 2# 3x10 ea, 2# 3x10 ea, 2#   TB ankle 4 way Blue  3x10  Blue  3x10   4 way, blue, 3x10 4 way, blue, 3x10   HR/TR - standing 30x ea 30x ea 30x ea 30x ea 30x ea   Mini squats 3x10, 5"  3x10, 5"  3x10 5"  3x10 5" 3x10 5"   Gait training  on TM, 1 5-2 5 mph, both UE assist on handrails  x8 mins on TM, 1 5-2 5 mph, both UE assist on handrails  x8 mins TM, 1 5-2 5 mph, both UE assist on handrails  x10 mins On TM, 1 5-2 5 mph, both UE assist on handrails  x5 mins On TM, 1 5-2 5 mph, both UE assist on handrails  x5 mins   Standing hip 3 way 2#, 3x10 ea 2# 3x10 ea 2#, 3x10 ea  3x10 ea  3x10 ea    SLS  30" x4  30" x4 30" x4 on foam       Step up and overs - fwd and reverse      "B" step, 20x                                                                                    Assessment: Tolerated treatment well  Progressed SLS to foam surface  Added step up and overs - patient reported "stiffness" along medial aspect of ankle with this new activity  Patient demonstrated fatigue post treatment, exhibited good technique with therapeutic exercises and would benefit from continued PT      Plan: Continue per plan of care  Progress treatment as tolerated

## 2018-08-09 ENCOUNTER — EVALUATION (OUTPATIENT)
Dept: PHYSICAL THERAPY | Facility: CLINIC | Age: 53
End: 2018-08-09
Payer: COMMERCIAL

## 2018-08-09 ENCOUNTER — TRANSCRIBE ORDERS (OUTPATIENT)
Dept: PHYSICAL THERAPY | Facility: CLINIC | Age: 53
End: 2018-08-09

## 2018-08-09 DIAGNOSIS — S92.002D CLOSED DISPLACED FRACTURE OF LEFT CALCANEUS WITH ROUTINE HEALING, UNSPECIFIED PORTION OF CALCANEUS, SUBSEQUENT ENCOUNTER: Primary | ICD-10-CM

## 2018-08-09 PROCEDURE — 97164 PT RE-EVAL EST PLAN CARE: CPT | Performed by: PHYSICAL THERAPIST

## 2018-08-09 PROCEDURE — 97110 THERAPEUTIC EXERCISES: CPT | Performed by: PHYSICAL THERAPIST

## 2018-08-09 PROCEDURE — 97140 MANUAL THERAPY 1/> REGIONS: CPT | Performed by: PHYSICAL THERAPIST

## 2018-08-09 PROCEDURE — G8991 OTHER PT/OT GOAL STATUS: HCPCS | Performed by: PHYSICAL THERAPIST

## 2018-08-09 PROCEDURE — G8990 OTHER PT/OT CURRENT STATUS: HCPCS | Performed by: PHYSICAL THERAPIST

## 2018-08-09 NOTE — LETTER
2018    [address]     Patient: Jennifer Saleh   YOB: 1965   Date of Visit: 2018     Encounter Diagnosis     ICD-10-CM    1  Closed displaced fracture of left calcaneus with routine healing, unspecified portion of calcaneus, subsequent encounter S92 002D        Dear Dr Naheed Perez:    Please review the attached Plan of Care from  S Becker  recent visit  Please verify that you agree therapy should continue by signing the attached document and sending it back to our office  If you have any questions or concerns, please don't hesitate to call  Sincerely,    Deanna Adams, PT      Referring Provider:      I certify that I have read the below Plan of Care and certify the need for these services furnished under this plan of treatment while under my care  Pascale Tong DPM  [address]  [address]: [phone]          PT Re-evaluation     Today's date: 2018  Patient name: Jennifer Saleh  : 1965  MRN: [id number]  Referring provider: Connor Gant DPM  Dx:   Encounter Diagnosis     ICD-10-CM    1  Closed displaced fracture of left calcaneus with routine healing, unspecified portion of calcaneus, subsequent encounter S92 002D                   Subjective: "I'm pretty sore today   I might have over-did it on the Wii Fit yesterday "      Assessment  Impairments: abnormal gait, abnormal or restricted ROM, activity intolerance, impaired balance, pain with function and weight-bearing intolerance  Functional limitations: currently ambulating in CAM boot L foot with b/l axillary crutches WBAT (began WBAT yesterday per MD instruction); poor tolernace to prolonged standing, currently on short term disability from work    Assessment details: Jennifer Saleh has demonstrated decreased pain, increased strength, increased range of motion, and increased activity tolerance since starting physical therapy services  They report an overall improvement of 70% thus far  They continue to present with pain, decreased strength, decreased range of motion, and decreased activity tolerance and would benefit from additional skilled physical therapy interventions to address impairments and maximize function  Patient is not completely satisfied with his current motion  Has not yet attempted to do any of his usual dance activity and is somewhat fearful of re-injury at this time  Patient c/o swelling daily and continues to ice daily  Patient is able to navigate steps facing forward vs side stepping, non-reciprocal pattern and is slow and hesitant while on stairs  Standing tolerance has improved up to approx 2 hours per patient  He has tolerated at least 10 mins of consecutive walking on treadmill, level surface  Uses single crutch on occasion when he is navigating through larger stores or across longer distance through parking lots, etc        Barriers to therapy: Limitations with insurance coverage (limited to 20 visits/year)  Understanding of Dx/Px/POC: good   Prognosis: good    Goals  STGs  1  In 4-6 weeks, patient will demonstrate improved ankle ROM of 5-10*  2  In 4-6 weeks, patient will demonstrated decrease in edema of LLE 1cm or more  3  In 4-6 weeks, patient will be WBAT through LLE in sneaker  LTGs  1  In 6-12 weeks, patient will be independent with ambulation on level surfaces and stairs  2  In 6-12 weeks, patient will be independent with HEP and demonstrate readiness for DC from PT  3  In 10-12 weeks, patient will be able to tolerate standing for 3+ hours and able to RTW modified hours/modified time in standing      Plan  Patient would benefit from: skilled physical therapy  Referral necessary: No  Planned modality interventions: unattended electrical stimulation and cryotherapy  Planned therapy interventions: manual therapy, joint mobilization, balance/weight bearing training, home exercise program, therapeutic exercise, stretching, strengthening and patient education  Frequency: 2x week  Duration in weeks: 6  Plan of Care beginning date: 2018  Plan of Care expiration date: 2018  Treatment plan discussed with: patient        Subjective Evaluation    History of Present Illness  Date of onset: 2018  Date of surgery: 2018  Mechanism of injury: trauma  Mechanism of injury: Patient reports injury occurred at home when climbing a ladder to remove snow from his roof  Patient reports the ladder slipped out from under him and he fell from the top of the ladder (roof of one story home) onto patio  Patient fractured ankle (lateral malleolus) and fractured calcaneus in 3 places per patient  Patient had surgery on 18  Patient was discharged home after 2 days, was ordered to be in bed for approx 2 months, soft cast for first 3 months and then hard cast for 6 weeks  Patient was then placed in a CAM boot, NWB until yesterday (18)  Not a recurrent problem   Pain  Current pain rating: 3; RA = 3  At best pain ratin; RA = 0  At worst pain ratin; RA = 6-7  Location: L foot and ankle up to mid-shin  Quality: "burning" into heel  Relieving factors: rest  Aggravating factors: standing and walking    Social Support  Steps to enter house: yes (4 ANISA)  Stairs in house: yes (steps to basement)   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)  Lives with: spouse and adult children    Employment status: not working (currently employed [employer] full time () - currently on STD will be starting LTD end ;  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))  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, modern jazz)    Patient Goals  Patient goal: to get back to work        Objective     Observations   Left Ankle/Foot   Positive for incision       Additional Observation Details  Scabbing just lateral to lateral malleolus, otherwise incision has healed; no drainage noted    Neurological Testing     Sensation     Ankle/Foot   Left Ankle/Foot   Diminished: light touch  Absent: light touch    Comments   Left light touch: L foot, plantar surface of calcaneus and lateral surface of calcaneus       Active Range of Motion   Left Ankle/Foot   Dorsiflexion (ke): 2 degrees ; RA = 5*  Plantar flexion: 20 degrees ; RA = 35*    Strength/Myotome Testing     Left Hip   Planes of Motion   Extension: 4+; RA = 5  Abduction: 4+; RA = 5    Right Hip   Planes of Motion   Extension: 4+; RA = 5  Abduction: 4+; RA = 5    Left Knee   Flexion: 5  Extension: 5    Right Knee   Flexion: 5  Extension: 5    Left Ankle/Foot   Dorsiflexion: 4; RA = 4+  Plantar flexion: 4; RA = 4+  Inversion: 4; RA = 5  Eversion: 4; RA = 5    Additional Strength Details  MMT in NWB at IE    Swelling   Left Ankle/Foot   Metatarsal heads: 24 5 cm; RA = 23 cm  Figure 8: 54 5 cm; RA = 54 cm  Malleoli: 27 5 cm; RA = 28 cm    Right Ankle/Foot   Metatarsal heads: 23 5 cm  Figure 8: 53 cm  Malleoli: 25 cm    Ambulation   Weight-Bearing Status   Weight-Bearing Status (Left): weight-bearing as tolerated   in CAM  Assistive device used: crutches    General Comments     Ankle/Foot Comments   Moderate pitting edema along lateral aspect of L ankle and foot - provided with light compression sleeve and instructed to wear daily but to remove for sleep      RA: mild to moderate pitting edema along lateral aspect L ankle and dorsal foot      Daily Treatment Diary  Precautions: s/p calcaneal fracture, WBAT LLE,  DOS 1/23/18   Manual  731 8/2 8/7 8/9 7/26   L ankle PROM to devi  15' 15' 15' 15' 15'                                                                 Exercise Diary  7/31 8/2 8/7 8/9 7/26   HEP instruction (gastroc towel stretch, TB PF/DF, HS stretch)             NuStep L7,10'  L7, 10' L8, 10' L8, 10' L6, 10'   Elliptical    L1, 5'    Standing gastroc stretch 10"x10    Biodex maze Difficult, L10,  3x Difficult, L10,  3x Difficult, L10,  3x  Medium  L11, 3x   Biodex LOS  Medium, L10, 3x Medium, L10, 3x Difficult  L10, 3x   Medium  L11, 3x   SLR x 4 3x10 ea, 2# 3x10 ea, 2# 3x10 ea 2#  3x10 ea, 2#   TB ankle 4 way Blue  3x10  Blue  3x10    4 way, blue, 3x10   HR/TR - standing 30x ea 30x ea 30x ea Focus on L side NV 30x ea   Mini squats 3x10, 5"  3x10, 5"  3x10 5"  3x10, 5" 3x10 5"   Gait training  on TM, 1 5-2 5 mph, both UE assist on handrails  x8 mins on TM, 1 5-2 5 mph, both UE assist on handrails  x8 mins TM, 1 5-2 5 mph, both UE assist on handrails  x10 mins  On TM, 1 5-2 5 mph, both UE assist on handrails  x5 mins   Standing hip 3 way 2#, 3x10 ea 2# 3x10 ea 2#, 3x10 ea   3x10 ea    SLS  30" x4  30" x4 30" x4 on foam       Step up and overs - fwd and reverse      "B" step, 20x       Agility ladder             Line hops - double leg

## 2018-08-09 NOTE — PROGRESS NOTES
PT Re-evaluation     Today's date: 2018  Patient name: Mirian Rodriguez  : 1965  MRN: [id number]  Referring provider: Christen Hylton DPM  Dx:   Encounter Diagnosis     ICD-10-CM    1  Closed displaced fracture of left calcaneus with routine healing, unspecified portion of calcaneus, subsequent encounter S92 002D                   Subjective: "I'm pretty sore today  I might have over-did it on the Wii Fit yesterday "      Assessment  Impairments: abnormal gait, abnormal or restricted ROM, activity intolerance, impaired balance, pain with function and weight-bearing intolerance  Functional limitations: currently ambulating in CAM boot L foot with b/l axillary crutches WBAT (began WBAT yesterday per MD instruction); poor tolernace to prolonged standing, currently on short term disability from work    Assessment details: Mirian Rodriguez has demonstrated decreased pain, increased strength, increased range of motion, and increased activity tolerance since starting physical therapy services  They report an overall improvement of 70% thus far  They continue to present with pain, decreased strength, decreased range of motion, and decreased activity tolerance and would benefit from additional skilled physical therapy interventions to address impairments and maximize function  Patient is not completely satisfied with his current motion  Has not yet attempted to do any of his usual dance activity and is somewhat fearful of re-injury at this time  Patient c/o swelling daily and continues to ice daily  Patient is able to navigate steps facing forward vs side stepping, non-reciprocal pattern and is slow and hesitant while on stairs  Standing tolerance has improved up to approx 2 hours per patient  He has tolerated at least 10 mins of consecutive walking on treadmill, level surface   Uses single crutch on occasion when he is navigating through larger stores or across longer distance through parking lots, etc  Barriers to therapy: Limitations with insurance coverage (limited to 20 visits/year)  Understanding of Dx/Px/POC: good   Prognosis: good    Goals  STGs  1  In 4-6 weeks, patient will demonstrate improved ankle ROM of 5-10*  2  In 4-6 weeks, patient will demonstrated decrease in edema of LLE 1cm or more  3  In 4-6 weeks, patient will be WBAT through LLE in sneaker  LTGs  1  In 6-12 weeks, patient will be independent with ambulation on level surfaces and stairs  2  In 6-12 weeks, patient will be independent with HEP and demonstrate readiness for DC from PT  3  In 10-12 weeks, patient will be able to tolerate standing for 3+ hours and able to RTW modified hours/modified time in standing  Plan  Patient would benefit from: skilled physical therapy  Referral necessary: No  Planned modality interventions: unattended electrical stimulation and cryotherapy  Planned therapy interventions: manual therapy, joint mobilization, balance/weight bearing training, home exercise program, therapeutic exercise, stretching, strengthening and patient education  Frequency: 2x week  Duration in weeks: 6  Plan of Care beginning date: 6/26/2018  Plan of Care expiration date: 8/7/2018  Treatment plan discussed with: patient        Subjective Evaluation    History of Present Illness  Date of onset: 1/17/2018  Date of surgery: 1/23/2018  Mechanism of injury: trauma  Mechanism of injury: Patient reports injury occurred at home when climbing a ladder to remove snow from his roof  Patient reports the ladder slipped out from under him and he fell from the top of the ladder (roof of one story home) onto patio  Patient fractured ankle (lateral malleolus) and fractured calcaneus in 3 places per patient  Patient had surgery on 1/23/18  Patient was discharged home after 2 days, was ordered to be in bed for approx 2 months, soft cast for first 3 months and then hard cast for 6 weeks   Patient was then placed in a [facility] boot, NW until yesterday (18)  Not a recurrent problem   Pain  Current pain rating: 3; RA = 3  At best pain ratin; RA = 0  At worst pain ratin; RA = 6-7  Location: L foot and ankle up to mid-shin  Quality: "burning" into heel  Relieving factors: rest  Aggravating factors: standing and walking    Social Support  Steps to enter house: yes (4 ANISA)  Stairs in house: yes (steps to basement)   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)  Lives with: spouse and adult children    Employment status: not working (currently employed [employer] full time () - currently on STD will be starting LTD end of July;  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))  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, modern jazz)    Patient Goals  Patient goal: to get back to work        Objective     Observations   Left Ankle/Foot   Positive for incision  Additional Observation Details  Scabbing just lateral to lateral malleolus, otherwise incision has healed; no drainage noted    Neurological Testing     Sensation     Ankle/Foot   Left Ankle/Foot   Diminished: light touch  Absent: light touch    Comments   Left light touch: L foot, plantar surface of calcaneus and lateral surface of calcaneus       Active Range of Motion   Left Ankle/Foot   Dorsiflexion (ke): 2 degrees ; RA = 5*  Plantar flexion: 20 degrees ; RA = 35*    Strength/Myotome Testing     Left Hip   Planes of Motion   Extension: 4+; RA = 5  Abduction: 4+; RA = 5    Right Hip   Planes of Motion   Extension: 4+; RA = 5  Abduction: 4+; RA = 5    Left Knee   Flexion: 5  Extension: 5    Right Knee   Flexion: 5  Extension: 5    Left Ankle/Foot   Dorsiflexion: 4; RA = 4+  Plantar flexion: 4; RA = 4+  Inversion: 4; RA = 5  Eversion: 4; RA = 5    Additional Strength Details  MMT in NWB at [facility]    Swelling   Left Ankle/Foot   Metatarsal heads: 24 5 cm; RA = 23 cm  Figure 8: 54 5 cm; RA = 54 cm  Malleoli: 27 5 cm; RA = 28 cm    Right Ankle/Foot Metatarsal heads: 23 5 cm  Figure 8: 53 cm  Malleoli: 25 cm    Ambulation   Weight-Bearing Status   Weight-Bearing Status (Left): weight-bearing as tolerated   in CAM  Assistive device used: crutches    General Comments     Ankle/Foot Comments   Moderate pitting edema along lateral aspect of L ankle and foot - provided with light compression sleeve and instructed to wear daily but to remove for sleep      RA: mild to moderate pitting edema along lateral aspect L ankle and dorsal foot      Daily Treatment Diary  Precautions: s/p calcaneal fracture, WBAT LLE,  DOS 1/23/18   Manual  731 8/2 8/7 8/9 7/26   L ankle PROM to devi  15' 15' 15' 15' 15'                                                                 Exercise Diary  7/31 8/2 8/7 8/9 7/26   HEP instruction (gastroc towel stretch, TB PF/DF, HS stretch)             NuStep L7,10'  L7, 10' L8, 10' L8, 10' L6, 10'   Elliptical    L1, 5'    Standing gastroc stretch    10"x10    Biodex maze Difficult, L10,  3x Difficult, L10,  3x Difficult, L10,  3x  Medium  L11, 3x   Biodex LOS  Medium, L10, 3x Medium, L10, 3x Difficult  L10, 3x   Medium  L11, 3x   SLR x 4 3x10 ea, 2# 3x10 ea, 2# 3x10 ea 2#  3x10 ea, 2#   TB ankle 4 way Blue  3x10  Blue  3x10    4 way, blue, 3x10   HR/TR - standing 30x ea 30x ea 30x ea Focus on L side NV 30x ea   Mini squats 3x10, 5"  3x10, 5"  3x10 5"  3x10, 5" 3x10 5"   Gait training  on TM, 1 5-2 5 mph, both UE assist on handrails  x8 mins on TM, 1 5-2 5 mph, both UE assist on handrails  x8 mins TM, 1 5-2 5 mph, both UE assist on handrails  x10 mins  On TM, 1 5-2 5 mph, both UE assist on handrails  x5 mins   Standing hip 3 way 2#, 3x10 ea 2# 3x10 ea 2#, 3x10 ea   3x10 ea    SLS  30" x4  30" x4 30" x4 on foam       Step up and overs - fwd and reverse      "B" step, 20x       Agility ladder             Line hops - double leg

## 2018-08-14 ENCOUNTER — OFFICE VISIT (OUTPATIENT)
Dept: PHYSICAL THERAPY | Facility: CLINIC | Age: 53
End: 2018-08-14
Payer: COMMERCIAL

## 2018-08-14 DIAGNOSIS — S92.002D CLOSED DISPLACED FRACTURE OF LEFT CALCANEUS WITH ROUTINE HEALING, UNSPECIFIED PORTION OF CALCANEUS, SUBSEQUENT ENCOUNTER: Primary | ICD-10-CM

## 2018-08-14 PROCEDURE — 97140 MANUAL THERAPY 1/> REGIONS: CPT | Performed by: PHYSICAL THERAPIST

## 2018-08-14 PROCEDURE — 97110 THERAPEUTIC EXERCISES: CPT | Performed by: PHYSICAL THERAPIST

## 2018-08-14 NOTE — PROGRESS NOTES
Daily Note     Today's date: 2018  Patient name: Sheila Lowry  : 1965  MRN: [id number]  Referring provider: Robert Lagos DPM  Dx:   Encounter Diagnosis     ICD-10-CM    1  Closed displaced fracture of left calcaneus with routine healing, unspecified portion of calcaneus, subsequent encounter S92 002D                   Subjective: Patient reports he still has difficulty on stairs, especially when descending  Objective: See treatment diary below  Daily Treatment Diary  Precautions: s/p calcaneal fracture, WBAT LLE,  DOS 18   Manual  731    L ankle PROM to devi  15' 15' 15' 15' 10'                                                                 Exercise Diary     HEP instruction (gastroc towel stretch, TB PF/DF, HS stretch)             NuStep L7,10'  L7, 10' L8, 10' L8, 10' L8, 10'   Elliptical    L1, 5' L1 x8'   Standing gastroc stretch    10"x10    Biodex maze Difficult, L10,  3x Difficult, L10,  3x Difficult, L10,  3x  Medium  L9, 3x   Biodex LOS  Medium, L10, 3x Medium, L10, 3x Difficult  L10, 3x   Medium  L9, 3x   SLR x 4 3x10 ea, 2# 3x10 ea, 2# 3x10 ea 2#  3x10 ea, 2#   TB ankle 4 way Blue  3x10  Blue  3x10       HR/TR - standing 30x ea 30x ea 30x ea Focus on L side NV 30x with focus on L   Mini squats 3x10, 5"  3x10, 5"  3x10 5"  3x10, 5" 3x10, 5"   Gait training  on TM, 1 5-2 5 mph, both UE assist on handrails  x8 mins on TM, 1 5-2 5 mph, both UE assist on handrails  x8 mins TM, 1 5-2 5 mph, both UE assist on handrails  x10 mins  DC   Standing hip 3 way 2#, 3x10 ea 2# 3x10 ea 2#, 3x10 ea   3x10 ea    SLS  30" x4  30" x4 30" x4 on foam       Step up and overs - fwd and reverse      "B" step, 20x    "B" step, 20x   Agility ladder          NV   Line hops - double leg          NV    Lunges          L foot fwd, 5" x20                                   Assessment: Tolerated treatment well  Added lunges to focus on increasing DF in WB   Patient demonstrated fatigue post treatment, exhibited good technique with therapeutic exercises and would benefit from continued PT      Plan: Continue per plan of care  Progress treatment as tolerated

## 2018-08-16 ENCOUNTER — OFFICE VISIT (OUTPATIENT)
Dept: PHYSICAL THERAPY | Facility: CLINIC | Age: 53
End: 2018-08-16
Payer: COMMERCIAL

## 2018-08-16 DIAGNOSIS — S92.002D CLOSED DISPLACED FRACTURE OF LEFT CALCANEUS WITH ROUTINE HEALING, UNSPECIFIED PORTION OF CALCANEUS, SUBSEQUENT ENCOUNTER: Primary | ICD-10-CM

## 2018-08-16 PROCEDURE — 97140 MANUAL THERAPY 1/> REGIONS: CPT | Performed by: PHYSICAL THERAPIST

## 2018-08-16 PROCEDURE — 97110 THERAPEUTIC EXERCISES: CPT | Performed by: PHYSICAL THERAPIST

## 2018-08-20 ENCOUNTER — APPOINTMENT (OUTPATIENT)
Dept: PHYSICAL THERAPY | Facility: CLINIC | Age: 53
End: 2018-08-20
Payer: COMMERCIAL

## 2018-08-21 ENCOUNTER — OFFICE VISIT (OUTPATIENT)
Dept: PHYSICAL THERAPY | Facility: CLINIC | Age: 53
End: 2018-08-21
Payer: COMMERCIAL

## 2018-08-21 DIAGNOSIS — S92.002D CLOSED DISPLACED FRACTURE OF LEFT CALCANEUS WITH ROUTINE HEALING, UNSPECIFIED PORTION OF CALCANEUS, SUBSEQUENT ENCOUNTER: Primary | ICD-10-CM

## 2018-08-21 PROCEDURE — 97112 NEUROMUSCULAR REEDUCATION: CPT | Performed by: PHYSICAL THERAPIST

## 2018-08-21 PROCEDURE — 97110 THERAPEUTIC EXERCISES: CPT | Performed by: PHYSICAL THERAPIST

## 2018-08-21 NOTE — PROGRESS NOTES
Daily Note     Today's date: 2018  Patient name: Shanika Bender  : 1965  MRN: [id number]  Referring provider: Jovanny Sanches DPM  Dx:   Encounter Diagnosis     ICD-10-CM    1  Closed displaced fracture of left calcaneus with routine healing, unspecified portion of calcaneus, subsequent encounter S92 002D        Start Time: 1100          Subjective: Patient states more soreness than pain in left foot, PQ 2-3/10  Continues to be consistent with HEP 5-6x/wk         Objective: See treatment diary below  Daily Treatment Diary  Precautions: s/p calcaneal fracture, WBAT LLE,  DOS 18   Manual     L ankle PROM to devi  15' np due to time 13' 15' 10'                                                                 Exercise Diary     HEP instruction (gastroc towel stretch, TB PF/DF, HS stretch)             NuStep L8,10'  L8, 10' L8, 10' L8, 10' L8, 10'   Elliptical L1 x8'  L1 x8'   L1, 5' L1 x8'   Standing gastroc stretch 10"x10  10"x10   10"x10     Biodex maze Difficult, L9, 3x Difficult, L9, 3x Difficult, L10,  3x   Medium  L9, 3x   Biodex LOS Difficult, L9, 3x Difficult, L9, 3x Difficult  L10, 3x    Medium  L9, 3x   SLR x 4   np 3x10 ea 2#   3x10 ea, 2#   TB ankle 4 way DC ---         HR/TR - standing 30x with focus on L 30x with focus on L 30x ea Focus on L side NV 30x with focus on L   Mini squats 3x10, 5" on foam 3x10,  3x10 5"  3x10, 5" 3x10, 5"   Gait training    --- TM, 1 5-2 5 mph, both UE assist on handrails  x10 mins   DC   Standing hip 3 way 2#, 3x10 ea 2# 3x10 ea, bilat 2#, 3x10 ea    3x10 ea    SLS  30" x4 on foam 30"x4 on foam bilat 30" x4 on foam       Step up and overs - fwd and reverse  "B" step, 20x "B" step, 20x  "B" step, 20x    "B" step, 20x   Agility ladder Lateral stepping     Fwd-lateral stepping     3x ea np      NV   Line hops - double leg 10x side/side  10x fwd/back     Both with UE assist in // bars 10x side/side  10x fwd/back    bilat UE assist in // bars      NV    Lunges  L foot fwd, 5" x20 on foam L foot fwd, 5" x20 on foam       L foot fwd, 5" x20                                     Assessment: Increased WB through RLE, improved with verbal cueing  Overall limited L ankle mobility/ROM impacting heel strike/toe off gait mechanics  Plan: Continue per plan of care  Progress treatment as tolerated

## 2018-08-23 ENCOUNTER — OFFICE VISIT (OUTPATIENT)
Dept: PHYSICAL THERAPY | Facility: CLINIC | Age: 53
End: 2018-08-23
Payer: COMMERCIAL

## 2018-08-23 DIAGNOSIS — S92.002D CLOSED DISPLACED FRACTURE OF LEFT CALCANEUS WITH ROUTINE HEALING, UNSPECIFIED PORTION OF CALCANEUS, SUBSEQUENT ENCOUNTER: Primary | ICD-10-CM

## 2018-08-23 PROCEDURE — 97110 THERAPEUTIC EXERCISES: CPT

## 2018-08-23 NOTE — PROGRESS NOTES
Daily Note     Today's date: 2018  Patient name: Dalila Corado  : 1965  MRN: [id number]  Referring provider: Karen Frederick DPM  Dx:   Encounter Diagnosis     ICD-10-CM    1  Closed displaced fracture of left calcaneus with routine healing, unspecified portion of calcaneus, subsequent encounter S92 002D                   Subjective: Pt reports that he does not think that he can stand for 10 hrs to go back to work        Objective: See treatment diary below  aily Treatment Diary  Precautions: s/p calcaneal fracture, WBAT LLE,  DOS 18   Manual     L ankle PROM to devi  15' np due to time  13' 10'                                                                 Exercise Diary     HEP instruction (gastroc towel stretch, TB PF/DF, HS stretch)             NuStep L8,10'  L8, 10' L8, 10' L8, 10' L8, 10'   Elliptical L1 x8'  L1 x8'  L1 x8' L1, 5' L1 x8'   Standing gastroc stretch 10"x10  10"x10  10"x10 10"x10     Biodex maze Difficult, L9, 3x Difficult, L9, 3x Difficult, L10,  3x   Medium  L9, 3x   Biodex LOS Difficult, L9, 3x Difficult, L9, 3x Difficult  L10, 3x    Medium  L9, 3x   SLR x 4   np    3x10 ea, 2#   TB ankle 4 way DC ---         HR/TR - standing 30x with focus on L 30x with focus on L 30x ea Focus on L side NV 30x with focus on L   Mini squats 3x10, 5" on foam 3x10,  3x10 5"  3x10, 5" 3x10, 5"   Gait training    ---    DC   Standing hip 3 way 2#, 3x10 ea 2# 3x10 ea, bilat 2#, 3x10 ea    3x10 ea    SLS  30" x4 on foam 30"x4 on foam bilat 30" x4 on foam       Step up and overs - fwd and reverse  "B" step, 20x "B" step, 20x  "C" step, 20x    "B" step, 20x   Agility ladder Lateral stepping     Fwd-lateral stepping     3x ea np      NV   Line hops - double leg 10x side/side  10x fwd/back     Both with UE assist in // bars 10x side/side  10x fwd/back    bilat UE assist in // bars  10x side/side  10x fwd/back    bilat UE assist in // bars    NV    Lunges  L foot fwd, 5" x20 on foam L foot fwd, 5" x20 on foam   L foot fwd, 5" x20 on foam    L foot fwd, 5" x20                                   Assessment: Tolerated treatment well  Patient demonstrated fatigue post treatment, exhibited good technique with therapeutic exercises and would benefit from continued PT      Plan: Continue per plan of care  Progress treatment as tolerated

## 2018-08-28 ENCOUNTER — OFFICE VISIT (OUTPATIENT)
Dept: PHYSICAL THERAPY | Facility: CLINIC | Age: 53
End: 2018-08-28
Payer: COMMERCIAL

## 2018-08-28 DIAGNOSIS — S92.002D CLOSED DISPLACED FRACTURE OF LEFT CALCANEUS WITH ROUTINE HEALING, UNSPECIFIED PORTION OF CALCANEUS, SUBSEQUENT ENCOUNTER: Primary | ICD-10-CM

## 2018-08-28 PROCEDURE — 97110 THERAPEUTIC EXERCISES: CPT | Performed by: PHYSICAL THERAPIST

## 2018-08-28 NOTE — PROGRESS NOTES
Daily Note     Today's date: 2018  Patient name: Dominik Valdes  : 1965  MRN: [id number]  Referring provider: Kayla Wong DPM  Dx:   Encounter Diagnosis     ICD-10-CM    1  Closed displaced fracture of left calcaneus with routine healing, unspecified portion of calcaneus, subsequent encounter S92 002D                   Subjective: " I saw the doctor yesterday, Everything Looks good"       Objective: See treatment diary below      Manual     L ankle PROM to devi  15' np due to time  NP TIme  10'                                                                 Exercise Diary     HEP instruction (gastroc towel stretch, TB PF/DF, HS stretch)             NuStep L8,10'  L8, 10' L8, 10' L8, 10' L8, 10'   Elliptical L1 x8'  L1 x8'  L1 x8' L1, 10' L1 x8'   Standing gastroc stretch 10"x10  10"x10  10"x10 10"x10     Biodex maze Difficult, L9, 3x Difficult, L9, 3x Difficult, L10,  3x  Difficult, L10,  3x Medium  L9, 3x   Biodex LOS Difficult, L9, 3x Difficult, L9, 3x Difficult  L10, 3x  Difficult  L10, 3x  Medium  L9, 3x   SLR x 4   np    3x10 ea, 2#   TB ankle 4 way DC ---         HR/TR - standing 30x with focus on L 30x with focus on L 30x ea Focus on L side  30x with focus on L   Mini squats 3x10, 5" on foam 3x10,  3x10 5"  3x10, 5" 3x10, 5"   Gait training    ---    DC   Standing hip 3 way 2#, 3x10 ea 2# 3x10 ea, bilat 2#, 3x10 ea  2#, 3x10 ea  3x10 ea    SLS  30" x4 on foam 30"x4 on foam bilat 30" x4 on foam  30" x4 on foam     Step up and overs - fwd and reverse  "B" step, 20x "B" step, 20x  "C" step, 20x  "C" step, 20x  "B" step, 20x   Agility ladder Lateral stepping     Fwd-lateral stepping     3x ea np      NV   Line hops - double leg 10x side/side  10x fwd/back     Both with UE assist in // bars 10x side/side  10x fwd/back    bilat UE assist in // bars  10x side/side  10x fwd/back    bilat UE assist in // bars    NV    Lunges  L foot fwd, 5" x20 on foam L foot fwd, 5" x20 on foam   L foot fwd, 5" x20 on foam    L foot fwd, 5" x20                                     Assessment: Tolerated treatment well  Patient exhibited good technique with therapeutic exercises      Plan: Continue per plan of care

## 2018-08-30 ENCOUNTER — APPOINTMENT (OUTPATIENT)
Dept: PHYSICAL THERAPY | Facility: CLINIC | Age: 53
End: 2018-08-30
Payer: COMMERCIAL

## 2018-09-04 ENCOUNTER — OFFICE VISIT (OUTPATIENT)
Dept: PHYSICAL THERAPY | Facility: CLINIC | Age: 53
End: 2018-09-04
Payer: COMMERCIAL

## 2018-09-04 DIAGNOSIS — S92.002D CLOSED DISPLACED FRACTURE OF LEFT CALCANEUS WITH ROUTINE HEALING, UNSPECIFIED PORTION OF CALCANEUS, SUBSEQUENT ENCOUNTER: Primary | ICD-10-CM

## 2018-09-04 PROCEDURE — 97110 THERAPEUTIC EXERCISES: CPT

## 2018-09-04 NOTE — PROGRESS NOTES
Daily Note     Today's date: 2018  Patient name: Cristhian Javier  : 1965  MRN: [id number]  Referring provider: Lidia Lovell DPM  Dx:   Encounter Diagnosis     ICD-10-CM    1  Closed displaced fracture of left calcaneus with routine healing, unspecified portion of calcaneus, subsequent encounter S92 002D                   Subjective: Pt reports that he moved his daughter into college this weekend and he is returning to work part time tonight         Objective: See treatment diary below  Manual     L ankle PROM to devi  15' np due to time  NP TIme  NP                                                                 Exercise Diary     HEP instruction (gastroc towel stretch, TB PF/DF, HS stretch)             NuStep L8,10'  L8, 10' L8, 10' L8, 10' L8, 10'   Elliptical L1 x8'  L1 x8'  L1 x8' L1, 10' L1 x10'   Standing gastroc stretch 10"x10  10"x10  10"x10 10"x10  10"x10   Biodex maze Difficult, L9, 3x Difficult, L9, 3x Difficult, L10,  3x  Difficult, L10,  3x  Difficult, L8,  3x   Biodex LOS Difficult, L9, 3x Difficult, L9, 3x Difficult  L10, 3x  Difficult  L10, 3x  Difficult, L8,  3x   SLR x 4   np      TB ankle 4 way DC ---         HR/TR - standing 30x with focus on L 30x with focus on L 30x ea Focus on L side  30x with focus on L   Mini squats 3x10, 5" on foam 3x10,  3x10 5"  3x10, 5" 3x10, 5"   Gait training    ---       Standing hip 3 way 2#, 3x10 ea 2# 3x10 ea, bilat 2#, 3x10 ea  2#, 3x10 ea  3x10 ea 2#    SLS  30" x4 on foam 30"x4 on foam bilat 30" x4 on foam  30" x4 on foam  30" x4 on foam   Step up and overs - fwd and reverse  "B" step, 20x "B" step, 20x  "C" step, 20x  "C" step, 20x  "C" step, 20x   Agility ladder Lateral stepping     Fwd-lateral stepping     3x ea np      Line hops - double leg 10x side/side  10x fwd/back     Both with UE assist in // bars 10x side/side  10x fwd/back    bilat UE assist in // bars  10x side/side  10x fwd/back    bilat UE assist in // bars  10x side/side  10x fwd/back    bilat UE assist in // bars    Lunges  L foot fwd, 5" x20 on foam L foot fwd, 5" x20 on foam   L foot fwd, 5" x20 on foam   L foot fwd, 5" x20 on foam                                 Assessment: Tolerated treatment well  Pt has no exacerbations of symptoms this date  Patient demonstrated fatigue post treatment, exhibited good technique with therapeutic exercises and would benefit from continued PT      Plan: Continue per plan of care  Progress treatment as tolerated

## 2018-09-18 ENCOUNTER — APPOINTMENT (OUTPATIENT)
Dept: PHYSICAL THERAPY | Facility: CLINIC | Age: 53
End: 2018-09-18
Payer: COMMERCIAL

## 2018-09-28 ENCOUNTER — TRANSCRIBE ORDERS (OUTPATIENT)
Dept: PHYSICAL THERAPY | Facility: CLINIC | Age: 53
End: 2018-09-28

## 2018-09-28 ENCOUNTER — EVALUATION (OUTPATIENT)
Dept: PHYSICAL THERAPY | Facility: CLINIC | Age: 53
End: 2018-09-28
Payer: COMMERCIAL

## 2018-09-28 DIAGNOSIS — S92.002D CLOSED DISPLACED FRACTURE OF LEFT CALCANEUS WITH ROUTINE HEALING, UNSPECIFIED PORTION OF CALCANEUS, SUBSEQUENT ENCOUNTER: Primary | ICD-10-CM

## 2018-09-28 DIAGNOSIS — S92.045D CLOSED NONDISPLACED FRACTURE OF TUBEROSITY OF LEFT CALCANEUS WITH ROUTINE HEALING, UNSPECIFIED FRACTURE MORPHOLOGY, SUBSEQUENT ENCOUNTER: Primary | ICD-10-CM

## 2018-09-28 PROCEDURE — G8990 OTHER PT/OT CURRENT STATUS: HCPCS | Performed by: PHYSICAL THERAPIST

## 2018-09-28 PROCEDURE — 97110 THERAPEUTIC EXERCISES: CPT | Performed by: PHYSICAL THERAPIST

## 2018-09-28 PROCEDURE — G8991 OTHER PT/OT GOAL STATUS: HCPCS | Performed by: PHYSICAL THERAPIST

## 2018-09-28 NOTE — PROGRESS NOTES
PT Re-evaluation and Discharge    Today's date: 2018  Patient name: Yunior Dumas  : 1965  MRN: [id number]  Referring provider: Geraldine Galindo DPM  Dx:   Encounter Diagnosis     ICD-10-CM    1  Closed displaced fracture of left calcaneus with routine healing, unspecified portion of calcaneus, subsequent encounter S92 002D        Assessment  Impairments: abnormal gait, abnormal or restricted ROM, activity intolerance, impaired balance, pain with function and weight-bearing intolerance    Assessment details:   [facility] and strength of the left foot/ankle and LE, with subjective improvement noted per pt  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  Pt reports no functional limitation with ambulation, stair mobility, or functional mobility  Pt feels that he has reached his maximal functional level with skilled PT and is agreeable to d/c 2* maximal level reached and insurance limitations  Pt educated to contact clinic if change in status occurs or additional PT services required  Pt also educated about various wellness programs available at clinic  Barriers to therapy: Limitations with insurance coverage (limited to 20 visits/year)  Understanding of Dx/Px/POC: good   Prognosis: good    Goals  STGs  1  In 4-6 weeks, patient will demonstrate improved ankle ROM of 5-10*- Met  2  In 4-6 weeks, patient will demonstrated decrease in edema of LLE 1cm or more- Met  3  In 4-6 weeks, patient will be WBAT through LLE in sneaker - Met    LTGs  1  In 6-12 weeks, patient will be independent with ambulation on level surfaces and stairs- Met  2  In 6-12 weeks, patient will be independent with HEP and demonstrate readiness for DC from PT- Met  3   In 10-12 weeks, patient will be able to tolerate standing for 3+ hours and able to RTW modified hours/modified time in standing - Met    Plan  Patient would benefit from: d/c from skilled physical therapy  Referral necessary: No  Planned modality interventions: unattended electrical stimulation and cryotherapy  Planned therapy interventions: manual therapy, joint mobilization, balance/weight bearing training, home exercise program, therapeutic exercise, stretching, strengthening and patient education    Treatment plan discussed with: patient  Pt agreeable to d/c from skilled PT at this time  Subjective Evaluation    History of Present Illness  Date of onset: 2018  Date of surgery: 2018  Mechanism of injury: trauma  Mechanism of injury: Patient reports injury occurred at home when climbing a ladder to remove snow from his roof  Patient reports the ladder slipped out from under him and he fell from the top of the ladder (roof of one story home) onto patio  Patient fractured ankle (lateral malleolus) and fractured calcaneus in 3 places per patient  Patient had surgery on 18  Patient was discharged home after 2 days, was ordered to be in bed for approx 2 months, soft cast for first 3 months and then hard cast for 6 weeks  Patient was then placed in a CAM boot, NWB until (18)  Presently, pt reports 75% improvement with skilled PT, noting increased strength, mobility, and endurance  Reports he has RTW without problems, just reports fatigue at end of day  Pt feels that he has reached his maximal functional level with skilled PT and is agreeable to d/c at this time    Not a recurrent problem   Pain  Current pain rating: 3; RA = 3  RA : 0  At best pain ratin; RA = 0  RA : 0  At worst pain ratin; RA = 6-7 RA : 2  Location: L foot and ankle up to mid-shin  Quality: "burning" into heel  Relieving factors: rest  Aggravating factors: standing and walking    Social Support  Steps to enter house: yes (4 ANISA)  Stairs in house: yes (steps to basement)   Lives in: [place] house (ranch with basement)  Lives with: spouse and adult children    Employment status: not working (currently employed [employer] full time () - currently on STD will be starting LTD end of July;  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))  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, modern jazz)    Patient Goals  Patient goal: to get back to work      Objective     Observations   Left Ankle/Foot   Positive for incision  Additional Observation Details  Scabbing just lateral to lateral malleolus, otherwise incision has healed; no drainage noted    Neurological Testing     Sensation     Ankle/Foot   Left Ankle/Foot   Diminished: light touch  Absent: light touch    Comments   Left light touch: L foot, plantar surface of calcaneus and lateral surface of calcaneus       Active Range of Motion   Left Ankle/Foot   Dorsiflexion (ke): 2 degrees ; RA = 5* RA 9/28: 20*  Plantar flexion: 20 degrees ; RA = 35* RA 9/28: 45*    Strength/Myotome Testing     Left Hip   Planes of Motion   Extension: 4+; RA = 5   RA 9/28: 5  Abduction: 4+; RA = 5   RA 9/28: 5    Right Hip   Planes of Motion   Extension: 4+; RA = 5   RA 9/28: 5  Abduction: 4+; RA = 5   RA 9/28: 5    Left Knee   Flexion: 5    RA 9/28: 5  Extension: 5    RA 9/28: 5    Right Knee   Flexion: 5    RA 9/28: 5  Extension: 5    RA 9/28: 5    Left Ankle/Foot   Dorsiflexion: 4; RA = 4+  RA 9/28: 5  Plantar flexion: 4; RA = 4+  RA 9/28: 5  Inversion: 4; RA = 5   RA 9/28: 5  Eversion: 4; RA = 5   RA 9/28: 5    Additional Strength Details  MMT in NWB at IE      Ambulation   Weight-Bearing Status   Weight-Bearing Status (Left): FWB without AD      Flowsheet Rows      Most Recent Value   PT/OT G-Codes   Current Score  65   Projected Score  53   FOTO information reviewed  Yes   Assessment Type  Re-evaluation   G code set  Other PT/OT Primary   Other PT Primary Current Status ()  CJ   Other PT Primary Goal Status ()  CK          Manual  9/28 8/21 8/23 8/28 9/4   L ankle PROM to devi NP np due to time  NP TIme  NP                                                                 Exercise Diary  9/28 8/21 8/23 8/28 9/4   HEP instruction (gastroc towel stretch, TB PF/DF, HS stretch)             NuStep L8 10 min L8, 10' L8, 10' L8, 10' L8, 10'   Elliptical L1 10 min  L1 x8'  L1 x8' L1, 10' L1 x10'   Standing gastroc stretch 10x10"  10"x10  10"x10 10"x10  10"x10   Biodex maze L8 3x Difficult, L9, 3x Difficult, L10,  3x  Difficult, L10,  3x  Difficult, L8,  3x   Biodex LOS L8 3x Difficult, L9, 3x Difficult  L10, 3x  Difficult  L10, 3x  Difficult, L8,  3x   SLR x 4  np      TB ankle 4 way  ---         HR/TR - standing 30x 30x with focus on L 30x ea Focus on L side  30x with focus on L   Mini squats 3x10 5" 3x10,  3x10 5"  3x10, 5" 3x10, 5"   Gait training   ---       Standing hip 3 way 3x10 ea 2# 2# 3x10 ea, bilat 2#, 3x10 ea  2#, 3x10 ea  3x10 ea 2#    SLS 4x30" on foam 30"x4 on foam bilat 30" x4 on foam  30" x4 on foam  30" x4 on foam   Step up and overs - fwd and reverse "C" step 20x "B" step, 20x  "C" step, 20x  "C" step, 20x  "C" step, 20x   Agility ladder  np      Line hops - double leg 10x S/S  10x F/B    In // bars 10x side/side  10x fwd/back    bilat UE assist in // bars  10x side/side  10x fwd/back    bilat UE assist in // bars  10x side/side  10x fwd/back    bilat UE assist in // bars    Lunges L foot fwd   20x5" on foam L foot fwd, 5" x20 on foam   L foot fwd, 5" x20 on foam   L foot fwd, 5" x20 on foam

## 2018-09-28 NOTE — LETTER
2018    SHLOMO Hernandez/Tim Beasley    Patient: Jasper Banuelos   YOB: 1965   Date of Visit: 2018     Encounter Diagnosis     ICD-10-CM    1  Closed displaced fracture of left calcaneus with routine healing, unspecified portion of calcaneus, subsequent encounter S92 002D        Dear Dr Nguyen Herb:    Please review the attached Plan of Care from  S  recent visit  Please verify that you agree therapy should continue by signing the attached document and sending it back to our office  If you have any questions or concerns, please don't hesitate to call  Sincerely,    Carlene Joe PT      Referring Provider:      I certify that I have read the below Plan of Care and certify the need for these services furnished under this plan of treatment while under my care  Parminder Kelly DPM  [address]: [phone]          PT Re-evaluation and Discharge    Today's date: 2018  Patient name: Jasper Banuelos  : 1965  MRN: [id number]  Referring provider: Nette Islas DPM  Dx:   Encounter Diagnosis     ICD-10-CM    1  Closed displaced fracture of left calcaneus with routine healing, unspecified portion of calcaneus, subsequent encounter S92 002D        Assessment  Impairments: abnormal gait, abnormal or restricted ROM, activity intolerance, impaired balance, pain with function and weight-bearing intolerance    Assessment details:   PT notes the patient with [facility] ROM and strength of the left foot/ankle and LE, with subjective improvement noted per pt  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  Pt reports no functional limitation with ambulation, stair mobility, or functional mobility   Pt feels that he has reached his maximal functional level with skilled PT and is agreeable to d/c 2* maximal level reached and insurance limitations  Pt educated to contact clinic if change in status occurs or additional PT services required  Pt also educated about various wellness programs available at clinic  Barriers to therapy: Limitations with insurance coverage (limited to 20 visits/year)  Understanding of Dx/Px/POC: good   Prognosis: good    Goals  STGs  1  In 4-6 weeks, patient will demonstrate improved ankle ROM of 5-10*- Met  2  In 4-6 weeks, patient will demonstrated decrease in edema of LLE 1cm or more- Met  3  In 4-6 weeks, patient will be WBAT through LLE in sneaker - Met    LTGs  1  In 6-12 weeks, patient will be independent with ambulation on level surfaces and stairs- Met  2  In 6-12 weeks, patient will be independent with HEP and demonstrate readiness for DC from PT- Met  3  In 10-12 weeks, patient will be able to tolerate standing for 3+ hours and able to RTW modified hours/modified time in standing - Met    Plan  Patient would benefit from: d/c from skilled physical therapy  Referral necessary: No  Planned modality interventions: unattended electrical stimulation and cryotherapy  Planned therapy interventions: manual therapy, joint mobilization, balance/weight bearing training, home exercise program, therapeutic exercise, stretching, strengthening and patient education    Treatment plan discussed with: patient  Pt agreeable to d/c from skilled PT at this time  Subjective Evaluation    History of Present Illness  Date of onset: 1/17/2018  Date of surgery: 1/23/2018  Mechanism of injury: trauma  Mechanism of injury: Patient reports injury occurred at home when climbing a ladder to remove snow from his roof  Patient reports the ladder slipped out from under him and he fell from the top of the ladder (roof of one story home) onto patio  Patient fractured ankle (lateral malleolus) and fractured calcaneus in 3 places per patient  Patient had surgery on 1/23/18  Patient was discharged home after 2 days, was ordered to be in bed for approx 2 months, soft cast for first 3 months and then hard cast for 6 weeks  Patient was then placed in a CAM boot, NWB until (18)  Presently, pt reports 75% improvement with skilled PT, noting increased strength, mobility, and endurance  Reports he has RTW without problems, just reports fatigue at end of day  Pt feels that he has reached his maximal functional level with skilled PT and is agreeable to d/c at this time  Not a recurrent problem   Pain  Current pain rating: 3; RA = 3  RA : 0  At best pain ratin; RA = 0  RA : 0  At worst pain ratin; RA = 6-7 RA : 2  Location: L foot and ankle up to mid-shin  Quality: "burning" into heel  Relieving factors: rest  Aggravating factors: standing and walking    Social Support  Steps to enter house: yes (4 ANISA)  Stairs in house: yes (steps to basement)   Lives in: [place] house (ranch with basement)  Lives with: spouse and adult children    Employment status: not working (currently employed [employer] full time () - currently on STD will be starting LTD end of July;  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))  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, modern jazz)    Patient Goals  Patient goal: to get back to work      Objective     Observations   Left Ankle/Foot   Positive for incision  Additional Observation Details  Scabbing just lateral to lateral malleolus, otherwise incision has healed; no drainage noted    Neurological Testing     Sensation     Ankle/Foot   Left Ankle/Foot   Diminished: light touch  Absent: light touch    Comments   Left light touch: L foot, plantar surface of calcaneus and lateral surface of calcaneus       Active Range of Motion   Left Ankle/Foot   Dorsiflexion (ke): 2 degrees ; RA = 5* RA : 20*  Plantar flexion: 20 degrees ; RA = 35* RA : 45*    Strength/Myotome Testing     Left Hip   Planes of Motion   Extension: 4+; RA = 5   RA 9/28: 5  Abduction: 4+; RA = 5   RA 9/28: 5    Right Hip   Planes of Motion   Extension: 4+; RA = 5   RA 9/28: 5  Abduction: 4+; RA = 5   RA 9/28: 5    Left Knee   Flexion: 5    RA 9/28: 5  Extension: 5    RA 9/28: 5    Right Knee   Flexion: 5    RA 9/28: 5  Extension: 5    RA 9/28: 5    Left Ankle/Foot   Dorsiflexion: 4; RA = 4+  RA 9/28: 5  Plantar flexion: 4; RA = 4+  RA 9/28: 5  Inversion: 4; RA = 5   RA 9/28: 5  Eversion: 4; RA = 5   RA 9/28: 5    Additional Strength Details  MMT in NWB at IE      Ambulation   Weight-Bearing Status   Weight-Bearing Status (Left): FWB without AD      Flowsheet Rows      Most Recent Value   PT/OT G-Codes   Current Score  65   Projected Score  53   FOTO information reviewed  Yes   Assessment Type  Re-evaluation   G code set  Other PT/OT Primary   Other PT Primary Current Status ()  CJ   Other PT Primary Goal Status ()  CK          Manual  9/28 8/21 8/23 8/28 9/4   L ankle PROM to devi NP np due to time  NP TIme  NP                                                                 Exercise Diary  9/28 8/21 8/23 8/28 9/4   HEP instruction (gastroc towel stretch, TB PF/DF, HS stretch)             NuStep L8 10 min L8, 10' L8, 10' L8, 10' L8, 10'   Elliptical L1 10 min  L1 x8'  L1 x8' L1, 10' L1 x10'   Standing gastroc stretch 10x10"  10"x10  10"x10 10"x10  10"x10   Biodex maze L8 3x Difficult, L9, 3x Difficult, L10,  3x  Difficult, L10,  3x  Difficult, L8,  3x   Biodex LOS L8 3x Difficult, L9, 3x Difficult  L10, 3x  Difficult  L10, 3x  Difficult, L8,  3x   SLR x 4  np      TB ankle 4 way  ---         HR/TR - standing 30x 30x with focus on L 30x ea Focus on L side  30x with focus on L   Mini squats 3x10 5" 3x10,  3x10 5"  3x10, 5" 3x10, 5"   Gait training   ---       Standing hip 3 way 3x10 ea 2# 2# 3x10 ea, bilat 2#, 3x10 ea  2#, 3x10 ea  3x10 ea 2#    SLS 4x30" on foam 30"x4 on foam bilat 30" x4 on foam  30" x4 on foam  30" x4 on foam   Step up and overs - fwd and reverse "C" step 20x "B" step, 20x  "C" step, 20x  "C" step, 20x  "C" step, 20x   Agility ladder  np      Line hops - double leg 10x S/S  10x F/B    In // bars 10x side/side  10x fwd/back    bilat UE assist in // bars  10x side/side  10x fwd/back    bilat UE assist in // bars  10x side/side  10x fwd/back    bilat UE assist in // bars    Lunges L foot fwd   20x5" on foam L foot fwd, 5" x20 on foam   L foot fwd, 5" x20 on foam   L foot fwd, 5" x20 on foam

## 2020-12-21 ENCOUNTER — APPOINTMENT (EMERGENCY)
Dept: RADIOLOGY | Facility: HOSPITAL | Age: 55
End: 2020-12-21
Payer: OTHER MISCELLANEOUS

## 2020-12-21 ENCOUNTER — HOSPITAL ENCOUNTER (EMERGENCY)
Facility: HOSPITAL | Age: 55
Discharge: HOME | End: 2020-12-21
Attending: FAMILY MEDICINE | Admitting: FAMILY MEDICINE
Payer: OTHER MISCELLANEOUS

## 2020-12-21 VITALS
HEART RATE: 80 BPM | SYSTOLIC BLOOD PRESSURE: 144 MMHG | DIASTOLIC BLOOD PRESSURE: 80 MMHG | WEIGHT: 132 LBS | TEMPERATURE: 97.5 F | OXYGEN SATURATION: 99 % | RESPIRATION RATE: 16 BRPM

## 2020-12-21 DIAGNOSIS — S63.502A SPRAIN OF LEFT WRIST, INITIAL ENCOUNTER: Primary | ICD-10-CM

## 2020-12-21 PROCEDURE — 99283 EMERGENCY DEPT VISIT LOW MDM: CPT

## 2020-12-21 PROCEDURE — 99282 EMERGENCY DEPT VISIT SF MDM: CPT | Performed by: FAMILY MEDICINE

## 2020-12-21 PROCEDURE — 73110 X-RAY EXAM OF WRIST: CPT

## 2020-12-21 RX ORDER — IBUPROFEN 600 MG/1
600 TABLET ORAL ONCE
Status: COMPLETED | OUTPATIENT
Start: 2020-12-21 | End: 2020-12-21

## 2020-12-21 RX ADMIN — IBUPROFEN 600 MG: 600 TABLET, FILM COATED ORAL at 08:06

## 2021-10-16 ENCOUNTER — APPOINTMENT (EMERGENCY)
Dept: RADIOLOGY | Facility: HOSPITAL | Age: 56
End: 2021-10-16
Payer: COMMERCIAL

## 2021-10-16 ENCOUNTER — HOSPITAL ENCOUNTER (EMERGENCY)
Facility: HOSPITAL | Age: 56
Discharge: HOME | End: 2021-10-16
Attending: EMERGENCY MEDICINE
Payer: COMMERCIAL

## 2021-10-16 VITALS
OXYGEN SATURATION: 98 % | HEART RATE: 74 BPM | TEMPERATURE: 97 F | WEIGHT: 141 LBS | DIASTOLIC BLOOD PRESSURE: 84 MMHG | BODY MASS INDEX: 22.66 KG/M2 | SYSTOLIC BLOOD PRESSURE: 143 MMHG | RESPIRATION RATE: 20 BRPM | HEIGHT: 66 IN

## 2021-10-16 DIAGNOSIS — S20.219A RIB CONTUSION: Primary | ICD-10-CM

## 2021-10-16 PROCEDURE — U0005 INFEC AGEN DETEC AMPLI PROBE: HCPCS | Performed by: PHYSICIAN ASSISTANT

## 2021-10-16 PROCEDURE — 73030 X-RAY EXAM OF SHOULDER: CPT

## 2021-10-16 PROCEDURE — U0003 INFECTIOUS AGENT DETECTION BY NUCLEIC ACID (DNA OR RNA); SEVERE ACUTE RESPIRATORY SYNDROME CORONAVIRUS 2 (SARS-COV-2) (CORONAVIRUS DISEASE [COVID-19]), AMPLIFIED PROBE TECHNIQUE, MAKING USE OF HIGH THROUGHPUT TECHNOLOGIES AS DESCRIBED BY CMS-2020-01-R: HCPCS | Performed by: PHYSICIAN ASSISTANT

## 2021-10-16 PROCEDURE — 96372 THER/PROPH/DIAG INJ SC/IM: CPT

## 2021-10-16 PROCEDURE — 71101 X-RAY EXAM UNILAT RIBS/CHEST: CPT

## 2021-10-16 PROCEDURE — 99285 EMERGENCY DEPT VISIT HI MDM: CPT | Performed by: PHYSICIAN ASSISTANT

## 2021-10-16 PROCEDURE — 99283 EMERGENCY DEPT VISIT LOW MDM: CPT

## 2021-10-16 RX ORDER — ACETAMINOPHEN 325 MG/1
975 TABLET ORAL ONCE
Status: COMPLETED | OUTPATIENT
Start: 2021-10-16 | End: 2021-10-16

## 2021-10-16 RX ORDER — KETOROLAC TROMETHAMINE 30 MG/ML
15 INJECTION, SOLUTION INTRAMUSCULAR; INTRAVENOUS ONCE
Status: COMPLETED | OUTPATIENT
Start: 2021-10-16 | End: 2021-10-16

## 2021-10-16 RX ORDER — IBUPROFEN 800 MG/1
800 TABLET ORAL EVERY 6 HOURS PRN
Qty: 21 TABLET | Refills: 0 | Status: SHIPPED | OUTPATIENT
Start: 2021-10-16 | End: 2022-07-29

## 2021-10-16 RX ADMIN — KETOROLAC TROMETHAMINE 15 MG: 30 INJECTION, SOLUTION INTRAMUSCULAR at 11:05

## 2021-10-16 RX ADMIN — ACETAMINOPHEN 975 MG: 325 TABLET ORAL at 11:04

## 2021-10-17 LAB — SARS-COV-2 RNA RESP QL NAA+PROBE: NEGATIVE

## 2022-07-29 ENCOUNTER — OFFICE VISIT (OUTPATIENT)
Dept: FAMILY MEDICINE CLINIC | Facility: CLINIC | Age: 57
End: 2022-07-29
Payer: COMMERCIAL

## 2022-07-29 VITALS
DIASTOLIC BLOOD PRESSURE: 84 MMHG | OXYGEN SATURATION: 97 % | TEMPERATURE: 96.6 F | HEART RATE: 72 BPM | HEIGHT: 64 IN | BODY MASS INDEX: 25.03 KG/M2 | WEIGHT: 146.6 LBS | SYSTOLIC BLOOD PRESSURE: 118 MMHG

## 2022-07-29 DIAGNOSIS — Z13.31 DEPRESSION SCREENING NEGATIVE: ICD-10-CM

## 2022-07-29 DIAGNOSIS — Z23 ENCOUNTER FOR IMMUNIZATION: ICD-10-CM

## 2022-07-29 DIAGNOSIS — Z12.11 SCREEN FOR COLON CANCER: ICD-10-CM

## 2022-07-29 DIAGNOSIS — Z12.5 SCREENING FOR PROSTATE CANCER: ICD-10-CM

## 2022-07-29 DIAGNOSIS — Z13.29 SCREENING FOR THYROID DISORDER: ICD-10-CM

## 2022-07-29 DIAGNOSIS — Z13.0 SCREENING FOR DEFICIENCY ANEMIA: ICD-10-CM

## 2022-07-29 DIAGNOSIS — Z13.1 SCREENING FOR DIABETES MELLITUS (DM): ICD-10-CM

## 2022-07-29 DIAGNOSIS — Z11.4 SCREENING FOR HIV (HUMAN IMMUNODEFICIENCY VIRUS): ICD-10-CM

## 2022-07-29 DIAGNOSIS — Z11.59 NEED FOR HEPATITIS C SCREENING TEST: ICD-10-CM

## 2022-07-29 DIAGNOSIS — Z76.89 ENCOUNTER TO ESTABLISH CARE: Primary | ICD-10-CM

## 2022-07-29 DIAGNOSIS — Z13.220 SCREENING FOR HYPERLIPIDEMIA: ICD-10-CM

## 2022-07-29 PROCEDURE — 99203 OFFICE O/P NEW LOW 30 MIN: CPT | Performed by: NURSE PRACTITIONER

## 2022-07-29 NOTE — PROGRESS NOTES
Assessment/Plan:    Problem List Items Addressed This Visit    None     Visit Diagnoses     Encounter to establish care    -  Primary    Need for hepatitis C screening test        Relevant Orders    Hepatitis C Antibody ([facility])    Screening for HIV (human immunodeficiency virus)        Relevant Orders    HIV 1/2 Antigen/Antibody (4th Generation) w Reflex [facility]    Encounter for immunization        Screening for deficiency anemia        Relevant Orders    CBC and differential    Screening for diabetes mellitus (DM)        Relevant Orders    Comprehensive metabolic panel    Screening for hyperlipidemia        Relevant Orders    Lipid panel    Screening for thyroid disorder        Relevant Orders    TSH, 3rd generation with Free T4 reflex    Screen for colon cancer        Relevant Orders    Ambulatory referral to Gastroenterology    Screening for prostate cancer        Relevant Orders    PSA, Total Screen    Depression screening negative               Diagnoses and all orders for this visit:    Encounter to establish care    Need for hepatitis C screening test  -     Hepatitis C Antibody ([facility]); Future    Screening for HIV (human immunodeficiency virus)  -     HIV 1/2 Antigen/Antibody (4th Generation) w Reflex [facility]; Future    Encounter for immunization    Screening for deficiency anemia  -     CBC and differential; Future    Screening for diabetes mellitus (DM)  -     Comprehensive metabolic panel; Future    Screening for hyperlipidemia  -     Lipid panel; Future    Screening for thyroid disorder  -     TSH, 3rd generation with Free T4 reflex; Future    Screen for colon cancer  -     Ambulatory referral to Gastroenterology; Future    Screening for prostate cancer  -     PSA, Total Screen; Future    Depression screening negative      No problem-specific Assessment & Plan notes found for this encounter  Subjective:      Patient ID: Marybeth Quiroz is a 61 y o  male      NP with no significant PMHx presents to establish  He has no acute complaints or concerns  Discussed routine testing for CRC, PSA, Hep C, HIV and he is agreeable to have done  The following portions of the patient's history were reviewed and updated as appropriate:   He has a past medical history of Arthritis, Depression, Kidney stone, Testicular torsion, and Visual impairment  ,  does not have a problem list on file  ,   has a past surgical history that includes Kidney stone surgery; Hand surgery (Left); and Fracture surgery (Left, 01/23/2018)  ,  family history includes Alcohol abuse in his brother and sister; Heart disease in his father; No Known Problems in his mother  ,   reports that he has never smoked  He has never used smokeless tobacco  He reports previous alcohol use  He reports previous drug use ,  has No Known Allergies     No current outpatient medications on file  No current facility-administered medications for this visit  Depression Screening and Follow-up Plan: Patient was screened for depression during today's encounter  They screened negative with a PHQ-2 score of 1  Review of Systems   All other systems reviewed and are negative  Objective:  Vitals:    07/29/22 1130   BP: 118/84   BP Location: Left arm   Patient Position: Sitting   Cuff Size: Standard   Pulse: 72   Temp: (!) 96 6 °F (35 9 °C)   TempSrc: Tympanic   SpO2: 97%   Weight: 66 5 kg (146 lb 9 6 oz)   Height: 5' 4" (1 626 m)     Body mass index is 25 16 kg/m²  Physical Exam  Vitals and nursing note reviewed  Constitutional:       Appearance: Normal appearance  He is well-developed  Interventions: Face mask in place  HENT:      Head: Normocephalic and atraumatic  Right Ear: Tympanic membrane, ear canal and external ear normal       Left Ear: Tympanic membrane, ear canal and external ear normal       Nose: Nose normal       Mouth/Throat:      Mouth: Mucous membranes are moist       Pharynx: Uvula midline     Eyes: General: Lids are normal       Conjunctiva/sclera: Conjunctivae normal       Pupils: Pupils are equal, round, and reactive to light  Neck:      Thyroid: No thyroid mass  Vascular: No JVD  Trachea: Trachea and phonation normal    Cardiovascular:      Rate and Rhythm: Normal rate and regular rhythm  Pulses: Normal pulses  Heart sounds: Normal heart sounds, S1 normal and S2 normal  No murmur heard  No friction rub  No gallop  Pulmonary:      Effort: Pulmonary effort is normal       Breath sounds: Normal breath sounds  Abdominal:      General: Bowel sounds are normal       Palpations: Abdomen is soft  Tenderness: There is no abdominal tenderness  Genitourinary:     Comments: Deferred  Musculoskeletal:         General: Normal range of motion  Cervical back: Full passive range of motion without pain, normal range of motion and neck supple  Right lower leg: No edema  Left lower leg: No edema  Lymphadenopathy:      Head:      Right side of head: No submental, submandibular, tonsillar, preauricular, posterior auricular or occipital adenopathy  Left side of head: No submental, submandibular, tonsillar, preauricular, posterior auricular or occipital adenopathy  Cervical: No cervical adenopathy  Skin:     General: Skin is warm and dry  Capillary Refill: Capillary refill takes less than 2 seconds  Neurological:      General: No focal deficit present  Mental Status: He is alert and oriented to person, place, and time  Cranial Nerves: Cranial nerves are intact  Sensory: Sensation is intact  Motor: Motor function is intact  Coordination: Coordination is intact  Gait: Gait is intact  Psychiatric:         Attention and Perception: Attention and perception normal          Mood and Affect: Mood and affect normal          Speech: Speech normal          Behavior: Behavior normal  Behavior is cooperative           Thought Content: Thought content normal          Cognition and Memory: Cognition normal          Judgment: Judgment normal

## 2022-07-30 LAB
EXTERNAL HIV SCREEN: NORMAL
HCV AB SER-ACNC: NEGATIVE

## 2022-08-18 ENCOUNTER — TELEPHONE (OUTPATIENT)
Dept: FAMILY MEDICINE CLINIC | Facility: CLINIC | Age: 57
End: 2022-08-18

## 2022-08-18 NOTE — TELEPHONE ENCOUNTER
Pt called in  He wanted to let you know that he had all of his labs drawn but the results were sent to [facility]

## 2022-09-15 ENCOUNTER — OFFICE VISIT (OUTPATIENT)
Dept: FAMILY MEDICINE CLINIC | Facility: CLINIC | Age: 57
End: 2022-09-15
Payer: COMMERCIAL

## 2022-09-15 VITALS
DIASTOLIC BLOOD PRESSURE: 74 MMHG | HEIGHT: 64 IN | OXYGEN SATURATION: 98 % | WEIGHT: 153 LBS | BODY MASS INDEX: 26.12 KG/M2 | HEART RATE: 78 BPM | SYSTOLIC BLOOD PRESSURE: 114 MMHG | TEMPERATURE: 96.7 F

## 2022-09-15 DIAGNOSIS — E66.3 OVERWEIGHT WITH BODY MASS INDEX (BMI) OF 26 TO 26.9 IN ADULT: ICD-10-CM

## 2022-09-15 DIAGNOSIS — Z13.220 SCREENING FOR HYPERLIPIDEMIA: ICD-10-CM

## 2022-09-15 DIAGNOSIS — Z13.1 SCREENING FOR DIABETES MELLITUS (DM): ICD-10-CM

## 2022-09-15 DIAGNOSIS — Z13.31 DEPRESSION SCREENING NEGATIVE: ICD-10-CM

## 2022-09-15 DIAGNOSIS — Z00.00 ANNUAL PHYSICAL EXAM: Primary | ICD-10-CM

## 2022-09-15 DIAGNOSIS — Z13.29 SCREENING FOR THYROID DISORDER: ICD-10-CM

## 2022-09-15 DIAGNOSIS — Z13.0 SCREENING FOR DEFICIENCY ANEMIA: ICD-10-CM

## 2022-09-15 PROCEDURE — 99396 PREV VISIT EST AGE 40-64: CPT | Performed by: NURSE PRACTITIONER

## 2022-09-15 NOTE — PROGRESS NOTES
[address] [facility]    NAME: Ollie Postal  AGE: 61 y o  SEX: male  : 1965     DATE: 9/15/2022     Assessment and Plan:     Problem List Items Addressed This Visit     Overweight with body mass index (BMI) of 26 to 26 9 in adult      Other Visit Diagnoses     Annual physical exam    -  Primary    Depression screening negative        Screening for deficiency anemia        Relevant Orders    CBC and differential    Screening for diabetes mellitus (DM)        Relevant Orders    Comprehensive metabolic panel    Screening for hyperlipidemia        Relevant Orders    Lipid Panel with Direct LDL reflex    Screening for thyroid disorder        Relevant Orders    TSH, 3rd generation with Free T4 reflex          Immunizations and preventive care screenings were discussed with patient today  Appropriate education was printed on patient's after visit summary  Counseling:  Alcohol/drug use: discussed moderation in alcohol intake, the recommendations for healthy alcohol use, and avoidance of illicit drug use  Dental Health: discussed importance of regular tooth brushing, flossing, and dental visits  Injury prevention: discussed safety/seat belts, safety helmets, smoke detectors, carbon dioxide detectors, and smoking near bedding or upholstery  Sexual health: discussed sexually transmitted diseases, partner selection, use of condoms, avoidance of unintended pregnancy, and contraceptive alternatives  Exercise: the importance of regular exercise/physical activity was discussed  Recommend exercise 3-5 times per week for at least 30 minutes  BMI Counseling: Body mass index is 26 26 kg/m²   The BMI is above normal  Nutrition recommendations include decreasing portion sizes, consuming healthier snacks, limiting drinks that contain sugar, moderation in carbohydrate intake, increasing intake of lean protein, reducing intake of saturated and trans fat and reducing intake of cholesterol  Exercise recommendations include exercising 3-5 times per week  No pharmacotherapy was ordered  Rationale for BMI follow-up plan is due to patient being overweight or obese  Return in about 1 year (around 9/15/2023) for Annual Physical      Chief Complaint:     Chief Complaint   Patient presents with    Follow-up     Blood work was done  History of Present Illness:     Adult Annual Physical   Patient here for a comprehensive physical exam  The patient reports no problems  Diet and Physical Activity  Diet/Nutrition: well balanced diet  Exercise: walking  Depression Screening  PHQ-2/9 Depression Screening         General Health  Sleep: gets 4-6 hours of sleep on average  Hearing: normal - bilateral   Vision: no vision problems, most recent eye exam <1 year ago and wears glasses  Dental: no dental visits for >1 year, brushes teeth three times daily and flosses teeth occasionally   Health  Symptoms include: none     Review of Systems:     Review of Systems   All other systems reviewed and are negative       Past Medical History:     Past Medical History:   Diagnosis Date    Arthritis     Depression     Kidney stone     Testicular torsion     history    Visual impairment     corrective lenses      Past Surgical History:     Past Surgical History:   Procedure Laterality Date    FRACTURE SURGERY Left 01/23/2018    calcaneus fracture    HAND SURGERY Left     to remove glass piece    KIDNEY STONE SURGERY      twice      Family History:     Family History   Problem Relation Age of Onset    No Known Problems Mother     Heart disease Father     Alcohol abuse Sister     Alcohol abuse Brother       Social History:     Social History     Socioeconomic History    Marital status: /Civil Union     Spouse name: None    Number of children: None    Years of education: None    Highest education level: None   Occupational History  None   Tobacco Use    Smoking status: Never Smoker    Smokeless tobacco: Never Used   Vaping Use    Vaping Use: Never used   Substance and Sexual Activity    Alcohol use: Not Currently    Drug use: Not Currently    Sexual activity: None   Other Topics Concern    None   Social History Narrative    None     Social Determinants of Health     Financial Resource Strain: Not on file   Food Insecurity: Not on file   Transportation Needs: Not on file   Physical Activity: Not on file   Stress: Not on file   Social Connections: Not on file   Intimate Partner Violence: Not on file   Housing Stability: Not on file      Current Medications:     No current outpatient medications on file  No current facility-administered medications for this visit  Allergies:     No Known Allergies   Physical Exam:     /74 (BP Location: Left arm, Patient Position: Sitting, Cuff Size: Adult)   Pulse 78   Temp (!) 96 7 °F (35 9 °C) (Tympanic)   Ht 5' 4" (1 626 m)   Wt 69 4 kg (153 lb)   SpO2 98%   BMI 26 26 kg/m²     Physical Exam  Vitals and nursing note reviewed  Constitutional:       Appearance: Normal appearance  He is well-developed  Interventions: Face mask in place  HENT:      Head: Normocephalic and atraumatic  Right Ear: External ear normal       Left Ear: External ear normal       Nose: Nose normal    Eyes:      Conjunctiva/sclera: Conjunctivae normal       Pupils: Pupils are equal, round, and reactive to light  Cardiovascular:      Rate and Rhythm: Normal rate and regular rhythm  Heart sounds: Normal heart sounds  Pulmonary:      Effort: Pulmonary effort is normal       Breath sounds: Normal breath sounds  Abdominal:      General: Bowel sounds are normal       Palpations: Abdomen is soft  Genitourinary:     Comments: Deferred  Musculoskeletal:         General: Normal range of motion  Cervical back: Normal range of motion and neck supple     Skin:     General: Skin is warm and dry       Capillary Refill: Capillary refill takes less than 2 seconds  Neurological:      Mental Status: He is alert and oriented to person, place, and time  Psychiatric:         Behavior: Behavior normal          Thought Content: Thought content normal          Judgment: Judgment normal           Orders Only on 07/30/2022   Component Date Value Ref Range Status    HEP C AB 07/30/2022 NEGATIVE   Final    HIV Screen 07/30/2022 Non-Reactive   Final      Ref Range & Units 7/30/22  8:53 AM   Glucose 65 - 99 mg/dL 89    BUN 7 - 28 mg/dL 11    Creatinine 0 53 - 1 30 mg/dL 0 86    Sodium 135 - 145 mmol/L 139    Potassium 3 5 - 5 2 mmol/L 4 4    Chloride 100 - 109 mmol/L 109    Carbon Dioxide 23 - 31 mmol/L 24    Calcium 8 5 - 10 1 mg/dL 8 9    Alkaline Phosphatase 35 - 120 U/L 86    Albumin 3 5 - 4 8 g/dL 3 4 Low     Bilirubin, Total 0 2 - 1 0 mg/dL 0 6    Comment: Use of this assay is not recommended for patients undergoing treatment with eltrombopag due to the potential for falsely elevated results  Protein, Total 6 3 - 8 3 g/dL 6 7    AST <41 U/L 23    ALT <56 U/L 37    Anion Gap 3 - 11 6    eGFRcr >59 100       Ref Range & Units 7/30/22  8:53 AM   Cholesterol <200 mg/dL 159    Triglyceride <150 mg/dL 94    Cholesterol, HDL, Direct >40 mg/dL 40 Low     Cholesterol, Non-HDL <160 mg/dL 119    Comment: Note: For [facility] interpretive guidelines please refer to the Laboratory Handbook  Cholesterol, LDL, Calculated <130 mg/dL 100    Comment: LDL Cholesterol was calculated using the Friedewald equation  Direct measurement of LDL is not indicated for this patient based on [facility]'s analytical algorithm for measurement of LDL Cholesterol     CHOL/HDL Ratio  3 98      Ref Range & Units 7/30/22  8:53 AM    PSA, Total <4 00 ng/mL 1 78       Ref Range & Units 7/30/22  8:53 AM   Hemoglobin 12 5 - 17 0 g/dL 14 3    Hematocrit 37 0 - 48 0 % 41 6    WBC 4 0 - 10 5 thou/cmm 5 6    RBC 4 00 - 5 40 mill/cmm 4 36    Platelet Count 406 - 350 thou/cmm 285    MPV 7 5 - 11 3 fL 8 1    MCV 80 - 100 fL 96    MCH 27 0 - 36 0 pg 32 7    MCHC 32 0 - 37 0 g/dL 34 2    RDW 12 0 - 16 0 % 12 6    Differential Type  AUTO    Absolute Neutrophils 1 8 - 7 8 thou/cmm 2 5    Absolute Lymphocytes 1 0 - 3 0 thou/cmm 2 2    Absolute Monocytes 0 3 - 1 0 thou/cmm 0 5    Absolute Eosinophils 0 0 - 0 5 thou/cmm 0 3    Absolute Basophils 0 0 - 0 1 thou/cmm 0 1    Neutrophils % 44    Lymphocytes % 39    Monocytes % 10    Eosinophils % 6    Basophils % 1       Ref Range & Units 7/30/22  8:53 AM   Thyroid Stimulating Hormone 0 36 - 3 74 uIU/mL 1 64      I have reviewed all the lab results  There are some abnormalities that are not critical to the patient's health, I have discussed these in person at this office appointment        Joyce Coronado, [facility]

## 2022-09-15 NOTE — PATIENT INSTRUCTIONS
Wellness Visit for Adults   AMBULATORY CARE:   A wellness visit  is when you see your healthcare provider to get screened for health problems  Your healthcare provider will also give you advice on how to stay healthy  Write down your questions so you remember to ask them  Ask your healthcare provider how often you should have a wellness visit  What happens at a wellness visit:  Your healthcare provider will ask about your health, and your family history of health problems  This includes high blood pressure, heart disease, and cancer  He or she will ask if you have symptoms that concern you, if you smoke, and about your mood  You may also be asked about your intake of medicines, supplements, food, and alcohol  Any of the following may be done:  · Your weight  will be checked  Your height may also be checked so your body mass index (BMI) can be calculated  Your BMI shows if you are at a healthy weight  · Your blood pressure  and heart rate will be checked  Your temperature may also be checked  · Blood and urine tests  may be done  Blood tests may be done to check your cholesterol levels  Abnormal cholesterol levels increase your risk for heart disease and stroke  You may also need a blood or urine test to check for diabetes if you are at increased risk  Urine tests may be done to look for signs of an infection or kidney disease  · A physical exam  includes checking your heartbeat and lungs with a stethoscope  Your healthcare provider may also check your skin to look for sun damage  · Screening tests  may be recommended  A screening test is done to check for diseases that may not cause symptoms  The screening tests you may need depend on your age, gender, family history, and lifestyle habits  For example, colorectal screening may be recommended if you are 48years old or older  Screening tests you need if you are a woman:   · A Pap smear  is used to screen for cervical cancer   Pap smears are usually done every 3 to 5 years depending on your age  You may need them more often if you have had abnormal Pap smear test results in the past  Ask your healthcare provider how often you should have a Pap smear  · A mammogram  is an x-ray of your breasts to screen for breast cancer  Experts recommend mammograms every 2 years starting at age 48 years  You may need a mammogram at age 52 years or younger if you have an increased risk for breast cancer  Talk to your healthcare provider about when you should start having mammograms and how often you need them  Vaccines you may need:   · Get an influenza vaccine  every year  The influenza vaccine protects you from the flu  Several types of viruses cause the flu  The viruses change over time, so new vaccines are made each year  · Get a tetanus-diphtheria (Td) booster vaccine  every 10 years  This vaccine protects you against tetanus and diphtheria  Tetanus is a severe infection that may cause painful muscle spasms and lockjaw  Diphtheria is a severe bacterial infection that causes a thick covering in the back of your mouth and throat  · Get a human papillomavirus (HPV) vaccine  if you are female and aged 23 to 32 or male 23 to 24 and never received it  This vaccine protects you from HPV infection  HPV is the most common infection spread by sexual contact  HPV may also cause vaginal, penile, and anal cancers  · Get a pneumococcal vaccine  if you are aged 72 years or older  The pneumococcal vaccine is an injection given to protect you from pneumococcal disease  Pneumococcal disease is an infection caused by pneumococcal bacteria  The infection may cause pneumonia, meningitis, or an ear infection  · Get a shingles vaccine  if you are 60 or older, even if you have had shingles before  The shingles vaccine is an injection to protect you from the varicella-zoster virus  This is the same virus that causes chickenpox   Shingles is a painful rash that develops in people who had chickenpox or have been exposed to the virus  How to eat healthy:  My Plate is a model for planning healthy meals  It shows the types and amounts of foods that should go on your plate  Fruits and vegetables make up about half of your plate, and grains and protein make up the other half  A serving of dairy is included on the side of your plate  The amount of calories and serving sizes you need depends on your age, gender, weight, and height  Examples of healthy foods are listed below:  · Eat a variety of vegetables  such as dark green, red, and orange vegetables  You can also include canned vegetables low in sodium (salt) and frozen vegetables without added butter or sauces  · Eat a variety of fresh fruits , canned fruit in 100% juice, frozen fruit, and dried fruit  · Include whole grains  At least half of the grains you eat should be whole grains  Examples include whole-wheat bread, wheat pasta, brown rice, and whole-grain cereals such as oatmeal     · Eat a variety of protein foods such as seafood (fish and shellfish), lean meat, and poultry without skin (turkey and chicken)  Examples of lean meats include pork leg, shoulder, or tenderloin, and beef round, sirloin, tenderloin, and extra lean ground beef  Other protein foods include eggs and egg substitutes, beans, peas, soy products, nuts, and seeds  · Choose low-fat dairy products such as skim or 1% milk or low-fat yogurt, cheese, and cottage cheese  · Limit unhealthy fats  such as butter, hard margarine, and shortening  Exercise:  Exercise at least 30 minutes per day on most days of the week  Some examples of exercise include walking, biking, dancing, and swimming  You can also fit in more physical activity by taking the stairs instead of the elevator or parking farther away from stores  Include muscle strengthening activities 2 days each week  Regular exercise provides many health benefits   It helps you manage your weight, and decreases your risk for type 2 diabetes, heart disease, stroke, and high blood pressure  Exercise can also help improve your mood  Ask your healthcare provider about the best exercise plan for you  General health and safety guidelines:   · Do not smoke  Nicotine and other chemicals in cigarettes and cigars can cause lung damage  Ask your healthcare provider for information if you currently smoke and need help to quit  E-cigarettes or smokeless tobacco still contain nicotine  Talk to your healthcare provider before you use these products  · Limit alcohol  A drink of alcohol is 12 ounces of beer, 5 ounces of wine, or 1½ ounces of liquor  · Lose weight, if needed  Being overweight increases your risk of certain health conditions  These include heart disease, high blood pressure, type 2 diabetes, and certain types of cancer  · Protect your skin  Do not sunbathe or use tanning beds  Use sunscreen with a SPF 15 or higher  Apply sunscreen at least 15 minutes before you go outside  Reapply sunscreen every 2 hours  Wear protective clothing, hats, and sunglasses when you are outside  · Drive safely  Always wear your seatbelt  Make sure everyone in your car wears a seatbelt  A seatbelt can save your life if you are in an accident  Do not use your cell phone when you are driving  This could distract you and cause an accident  Pull over if you need to make a call or send a text message  · Practice safe sex  Use latex condoms if are sexually active and have more than one partner  Your healthcare provider may recommend screening tests for sexually transmitted infections (STIs)  · Wear helmets, lifejackets, and protective gear  Always wear a helmet when you ride a bike or motorcycle, go skiing, or play sports that could cause a head injury  Wear protective equipment when you play sports  Wear a lifejacket when you are on a boat or doing water sports      © Copyright [employer] 2022 Information is for End User's use only and may not be sold, redistributed or otherwise used for commercial purposes  All illustrations and images included in CareNotes® are the copyrighted property of [employer]  or [employer]ish [employer]  The above information is an  only  It is not intended as medical advice for individual conditions or treatments  Talk to your doctor, nurse or pharmacist before following any medical regimen to see if it is safe and effective for you  Cholesterol and Your Health   AMBULATORY CARE:   Cholesterol  is a waxy, fat-like substance  Your body uses cholesterol to make hormones and new cells, and to protect nerves  Cholesterol is made by your body  It also comes from certain foods you eat, such as meat and dairy products  Your healthcare provider can help you set goals for your cholesterol levels  He or she can help you create a plan to meet your goals  Cholesterol level goals: Your cholesterol level goals depend on your risk for heart disease, your age, and your other health conditions  The following are general guidelines:  · Total cholesterol  includes low-density lipoprotein (LDL), high-density lipoprotein (HDL), and triglyceride levels  The total cholesterol level should be lower than 200 mg/dL and is best at about 150 mg/dL  · LDL cholesterol  is called bad cholesterol  because it forms plaque in your arteries  As plaque builds up, your arteries become narrow, and less blood flows through  When plaque decreases blood flow to your heart, you may have chest pain  If plaque completely blocks an artery that brings blood to your heart, you may have a heart attack  Plaque can break off and form blood clots  Blood clots may block arteries in your brain and cause a stroke  The level should be less than 130 mg/dL and is best at about 100 mg/dL  · HDL cholesterol  is called good cholesterol  because it helps remove LDL cholesterol from your arteries   It does this by attaching to LDL cholesterol and carrying it to your liver  Your liver breaks down LDL cholesterol so your body can get rid of it  High levels of HDL cholesterol can help prevent a heart attack and stroke  Low levels of HDL cholesterol can increase your risk for heart disease, heart attack, and stroke  The level should be 60 mg/dL or higher  · Triglycerides  are a type of fat that store energy from foods you eat  High levels of triglycerides also cause plaque buildup  This can increase your risk for a heart attack or stroke  If your triglyceride level is high, your LDL cholesterol level may also be high  The level should be less than 150 mg/dL  Any of the following can increase your risk for high cholesterol:   · Smoking cigarettes    · Being overweight or obese, or not getting enough exercise    · Drinking large amounts of alcohol    · A medical condition such as hypertension (high blood pressure) or diabetes    · Certain genes passed from your parents to you    · Age older than 65 years    What you need to know about having your cholesterol levels checked: Adults 21to 39years of age should have their cholesterol levels checked every 4 to 6 years  Adults 45 years or older should have their cholesterol checked every 1 to 2 years  You may need your cholesterol checked more often, or at a younger age, if you have risk factors for heart disease  You may also need to have your cholesterol checked more often if you have other health conditions, such as diabetes  Blood tests are used to check cholesterol levels  Blood tests measure your levels of triglycerides, LDL cholesterol, and HDL cholesterol  How healthy fats affect your cholesterol levels:  Healthy fats, also called unsaturated fats, help lower LDL cholesterol and triglyceride levels  Healthy fats include the following:  · Monounsaturated fats  are found in foods such as olive oil, canola oil, avocado, nuts, and olives      · Polyunsaturated fats,  such as omega 3 fats, are found in fish, such as salmon, trout, and tuna  They can also be found in plant foods such as flaxseed, walnuts, and soybeans  How unhealthy fats affect your cholesterol levels:  Unhealthy fats increase LDL cholesterol and triglyceride levels  They are found in foods high in cholesterol, saturated fat, and trans fat:  · Cholesterol  is found in eggs, dairy, and meat  · Saturated fat  is found in butter, cheese, ice cream, whole milk, and coconut oil  Saturated fat is also found in meat, such as sausage, hot dogs, and bologna  · Trans fat  is found in liquid oils and is used in fried and baked foods  Foods that contain trans fats include chips, crackers, muffins, sweet rolls, microwave popcorn, and cookies  Treatment  for high cholesterol will also decrease your risk of heart disease, heart attack, and stroke  Treatment may include any of the following:  · Lifestyle changes  may include food, exercise, weight loss, and quitting smoking  You may also need to decrease the amount of alcohol you drink  Your healthcare provider will want you to start with lifestyle changes  Other treatment may be added if lifestyle changes are not enough  Your healthcare provider may recommend you work with a team to manage hyperlipidemia  The team may include medical experts such as a dietitian, an exercise or physical therapist, and a behavior therapist  Your family members may be included in helping you create lifestyle changes  · Medicines  may be given to lower your LDL cholesterol, triglyceride levels, or total cholesterol level  You may need medicines to lower your cholesterol if any of the following is true:    ? You have a history of stroke, TIA, unstable angina, or a heart attack  ? Your LDL cholesterol level is 190 mg/dL or higher  ? You are age 36 to 76 years, have diabetes or heart disease risk factors, and your LDL cholesterol is 70 mg/dL or higher      · Supplements  include fish oil, red yeast rice, and garlic  Fish oil may help lower your triglyceride and LDL cholesterol levels  It may also increase your HDL cholesterol level  Red yeast rice may help decrease your total cholesterol level and LDL cholesterol level  Garlic may help lower your total cholesterol level  Do not take any supplements without talking to your healthcare provider  Food changes you can make to lower your cholesterol levels:  A dietitian can help you create a healthy eating plan  He or she can show you how to read food labels and choose foods low in saturated fat, trans fats, and cholesterol  · Decrease the total amount of fat you eat  Choose lean meats, fat-free or 1% fat milk, and low-fat dairy products, such as yogurt and cheese  Try to limit or avoid red meats  Limit or do not eat fried foods or baked goods, such as cookies  · Replace unhealthy fats with healthy fats  Cook foods in olive oil or canola oil  Choose soft margarines that are low in saturated fat and trans fat  Seeds, nuts, and avocados are other examples of healthy fats  · Eat foods with omega-3 fats  Examples include salmon, tuna, mackerel, walnuts, and flaxseed  Eat fish 2 times per week  Pregnant women should not eat fish that have high levels of mercury, such as shark, swordfish, and gus mackerel  · Increase the amount of high-fiber foods you eat  High-fiber foods can help lower your LDL cholesterol  Aim to get between 20 and 30 grams of fiber each day  Fruits and vegetables are high in fiber  Eat at least 5 servings each day  Other high-fiber foods are whole-grain or whole-wheat breads, pastas, or cereals, and brown rice  Eat 3 ounces of whole-grain foods each day  Increase fiber slowly  You may have abdominal discomfort, bloating, and gas if you add fiber to your diet too quickly  · Eat healthy protein foods  Examples include low-fat dairy products, skinless chicken and turkey, fish, and nuts      · Limit foods and drinks that are high in sugar  Your dietitian or healthcare provider can help you create daily limits for high-sugar foods and drinks  The limit may be lower if you have diabetes or another health condition  Limits can also help you lose weight if needed  Lifestyle changes you can make to lower your cholesterol levels:   · Maintain a healthy weight  Ask your healthcare provider what a healthy weight is for you  Ask him or her to help you create a weight loss plan if needed  Weight loss can decrease your total cholesterol and triglyceride levels  Weight loss may also help keep your blood pressure at a healthy level  · Be physically active throughout the day  Physical activity, such as exercise, can help lower your total cholesterol level and maintain a healthy weight  Physical activity can also help increase your HDL cholesterol level  Work with your healthcare provider to create an program that is right for you  Get at least 30 to 40 minutes of moderate physical activity most days of the week  Examples of exercise include brisk walking, swimming, or biking  Also include strength training at least 2 times each week  Your healthcare providers can help you create a physical activity plan  · Do not smoke  Nicotine and other chemicals in cigarettes and cigars can raise your cholesterol levels  Ask your healthcare provider for information if you currently smoke and need help to quit  E-cigarettes or smokeless tobacco still contain nicotine  Talk to your healthcare provider before you use these products  · Limit or do not drink alcohol  Alcohol can increase your triglyceride levels  Ask your healthcare provider before you drink alcohol  Ask how much is okay for you to drink in 24 hours or 1 week  Follow up with your doctor as directed:  Write down your questions so you remember to ask them during your visits    © Copyright [employer] 2022 Information is for End User's use only and may not be sold, redistributed or otherwise used for commercial purposes  All illustrations and images included in CareNotes® are the copyrighted property of [employer]  or [employer]ish Lee  The above information is an  only  It is not intended as medical advice for individual conditions or treatments  Talk to your doctor, nurse or pharmacist before following any medical regimen to see if it is safe and effective for you

## 2022-09-22 ENCOUNTER — OFFICE VISIT (OUTPATIENT)
Dept: GASTROENTEROLOGY | Facility: CLINIC | Age: 57
End: 2022-09-22
Payer: COMMERCIAL

## 2022-09-22 VITALS
SYSTOLIC BLOOD PRESSURE: 128 MMHG | OXYGEN SATURATION: 98 % | HEART RATE: 78 BPM | BODY MASS INDEX: 26.29 KG/M2 | HEIGHT: 64 IN | DIASTOLIC BLOOD PRESSURE: 78 MMHG | WEIGHT: 154 LBS

## 2022-09-22 DIAGNOSIS — Z12.11 SCREEN FOR COLON CANCER: ICD-10-CM

## 2022-09-22 PROCEDURE — 99203 OFFICE O/P NEW LOW 30 MIN: CPT | Performed by: PHYSICIAN ASSISTANT

## 2022-09-22 NOTE — PROGRESS NOTES
[facility] - Outpatient Consultation  Gabriel Nagy 61 y o  male MRN: [id number]  Encounter: [id number]          ASSESSMENT AND PLAN:      1  Screen for colon cancer  This will be his 1st screening colonoscopy  Average risk    ______________________________________________________________________    HPI:  50-year-old male presents for evaluation prior to scheduling a screening colonoscopy  He has never had a colonoscopy  He denies any family history of colorectal cancer  He notes a recent rectal pressure this is not pain  He has no diarrhea, constipation, rectal pain, rectal bleeding or unexpected weight loss  REVIEW OF SYSTEMS:    CONSTITUTIONAL: Denies any fever, chills, rigors, and weight loss  HEENT: No earache or tinnitus  Denies hearing loss or visual disturbances  CARDIOVASCULAR: No chest pain or palpitations  RESPIRATORY: Denies any cough, hemoptysis, shortness of breath or dyspnea on exertion  GASTROINTESTINAL: As noted in the History of Present Illness  GENITOURINARY: No problems with urination  Denies any hematuria or dysuria  NEUROLOGIC: No dizziness or vertigo, denies headaches  MUSCULOSKELETAL: Denies any muscle or joint pain  SKIN: Denies skin rashes or itching  ENDOCRINE: Denies excessive thirst  Denies intolerance to heat or cold  PSYCHOSOCIAL: Denies depression or anxiety  Denies any recent memory loss         Historical Information   Past Medical History:   Diagnosis Date    Arthritis     Depression     Kidney stone     Testicular torsion     history    Visual impairment     corrective lenses     Past Surgical History:   Procedure Laterality Date    FRACTURE SURGERY Left 01/23/2018    calcaneus fracture    HAND SURGERY Left     to remove glass piece    KIDNEY STONE SURGERY      twice     Social History   Social History     Substance and Sexual Activity   Alcohol Use Not Currently     Social History     Substance and Sexual Activity Drug Use Not Currently     Social History     Tobacco Use   Smoking Status Never Smoker   Smokeless Tobacco Never Used     Family History   Problem Relation Age of Onset    No Known Problems Mother     Heart disease Father     Alcohol abuse Sister     Alcohol abuse Brother        Meds/Allergies     No current outpatient medications on file  No Known Allergies        Objective     There were no vitals taken for this visit  There is no height or weight on file to calculate BMI  PHYSICAL EXAM:      General Appearance:   Alert, cooperative, no distress   HEENT:   Normocephalic, atraumatic, anicteric      Neck:  Supple, symmetrical, trachea midline   Lungs:   Clear to auscultation bilaterally; no rales, rhonchi or wheezing; respirations unlabored    Heart[de-identified]   Regular rate and rhythm; no murmur, rub, or gallop  Abdomen:   Soft, non-tender, non-distended; normal bowel sounds; no masses, no organomegaly    Genitalia:   Deferred    Rectal:   Deferred    Extremities:  No cyanosis, clubbing or edema    Pulses:  2+ and symmetric    Skin:  No jaundice, rashes, or lesions    Lymph nodes:  No palpable cervical lymphadenopathy        Lab Results:   No visits with results within 1 Day(s) from this visit  Latest known visit with results is:   Orders Only on 07/30/2022   Component Date Value    HEP C AB 07/30/2022 NEGATIVE     HIV Screen 07/30/2022 Non-Reactive          Radiology Results:   No results found  Answers for HPI/ROS submitted by the patient on 9/21/2022  anorexia: No  arthralgias: No  belching: No  constipation: No  diarrhea: No  dysuria: No  fever: No  flatus: No  frequency: No  headaches: No  hematochezia: No  hematuria: No  melena: No  myalgias: No  nausea:  No  weight loss: No  vomiting: No  Aggravated by: nothing  Relieved by: nothing

## 2022-09-22 NOTE — PATIENT INSTRUCTIONS
Scheduled date of colonoscopy (as of today):12/16/22  Physician performing colonoscopy:Jeanette  Location of colonoscopy:[facility]  Bowel prep reviewed with patient:Marly/Miralax  Instructions reviewed with patient by:Chris chatman  Clearances:  none

## 2022-10-24 ENCOUNTER — TELEPHONE (OUTPATIENT)
Dept: GASTROENTEROLOGY | Facility: CLINIC | Age: 57
End: 2022-10-24

## 2022-10-24 NOTE — TELEPHONE ENCOUNTER
Called and reschled patient   he has prep Instructions    Scheduled date of colonoscopy (as of today):2/10/23  Physician performing colonoscopy:Jeanette  Location of colonoscopy:[facility]  Bowel prep reviewed with patient:Marly/Miralax  Instructions reviewed with patient by:Chris chatman  Clearances:  none

## 2022-10-24 NOTE — TELEPHONE ENCOUNTER
Patients GI provider:  Dr Michail Opitz    Number to return call: [phone]    Reason for call: Pt calling to reschedule his colonoscopy    Scheduled procedure/appointment date if applicable: Procedure 68/37/91

## 2022-11-01 ENCOUNTER — PATIENT MESSAGE (OUTPATIENT)
Dept: FAMILY MEDICINE CLINIC | Facility: CLINIC | Age: 57
End: 2022-11-01

## 2022-11-01 ENCOUNTER — TELEMEDICINE (OUTPATIENT)
Dept: FAMILY MEDICINE CLINIC | Facility: CLINIC | Age: 57
End: 2022-11-01

## 2022-11-01 VITALS — BODY MASS INDEX: 26.12 KG/M2 | WEIGHT: 153 LBS | HEIGHT: 64 IN | TEMPERATURE: 97.9 F

## 2022-11-01 DIAGNOSIS — R09.81 SINUS CONGESTION: ICD-10-CM

## 2022-11-01 DIAGNOSIS — U07.1 COVID-19: Primary | ICD-10-CM

## 2022-11-01 LAB — SARS-COV-2 AG UPPER RESP QL IA: ABNORMAL

## 2022-11-01 RX ORDER — FLUTICASONE PROPIONATE 50 MCG
1 SPRAY, SUSPENSION (ML) NASAL DAILY
Qty: 16 G | Refills: 0 | Status: SHIPPED | OUTPATIENT
Start: 2022-11-01

## 2022-11-01 NOTE — LETTER
November 1, 2022    Patient: Benji Davis  YOB: 1965  Date of Last Encounter: 9/15/2022      To whom it may concern:     Benji Davis has tested positive for COVID-19 (Coronavirus)  He may return to work on 11/6/2022, which is 5 days from illness onset (provided symptoms are improving) and 24 hours without fever or taking medication to reduce fever      Sincerely,         Mehdi MIC Gallego

## 2022-11-01 NOTE — LETTER
Updated CDC isolation:    An unboostered  COVID positive individual (without 3rd vaccine dose) should isolate for 5 days then wear mask for 5 days, this includes the COVID positive individual to mask within the house with family around after the 5 day isolation from the family  The vaccinated unboostered asymptomatic family do not need to be tested as they are not near the COVID positive family member  Family member may test with OTC antigen test if they wish, positive antigen test is considered positive COVID and that individual will practice isolation as above and PCR is not recommended [facility]  The exposed asymptomatic COVID vaccinated without 3rd dose family member should quarantine (stay at home while the COVID positive family member is isolated from asymptomatic family member(s))  CDC now recommends quarantine for 5 days followed by strict mask use for an additional 5 days  Alternatively, if a 5-day quarantine is not feasible, it is imperative that an exposed person wear a well-fitting mask at all times when around others for 10 days after exposure  For all those exposed, best practice would also include a test for COVID at day 5 after exposure (antigen OR PCR)  If symptoms occur, individuals should immediately quarantine until a negative test confirms symptoms are not attributable to [place]  After your quarantine (paragraph above) you are able to return to work and continue to wear a mask as described in paragraph above  If you experience symptoms anytime during your 5 day quarantine (staying home) set up a virtual visit and we will do a PCR at that time  Or you can do the antigen test as above and if positive  follow the isolation protocol outlined above

## 2022-11-01 NOTE — PROGRESS NOTES
COVID-19 Outpatient Progress Note    Assessment/Plan:    Problem List Items Addressed This Visit    None     Visit Diagnoses     COVID-19    -  Primary    Relevant Medications    fluticasone (FLONASE) 50 mcg/act nasal spray    Sinus congestion        Relevant Medications    fluticasone (FLONASE) 50 mcg/act nasal spray         Disposition:     Discussed symptom directed medication options with patient  Discussed vitamin D, vitamin C, and/or zinc supplementation with patient  Discussed risks, benefits, and side effects of inhaled corticosteroids and they agree to treatment  I have spent 15 minutes directly with the patient  Greater than 50% of this time was spent in counseling/coordination of care regarding: diagnostic results, prognosis, risks and benefits of treatment options, instructions for management, patient and family education, importance of treatment compliance, risk factor reductions and impressions  Encounter provider: MIC Verma     Provider located at: [address] [place]  [phone]  [address] [facility]     Recent Visits  No visits were found meeting these conditions  Showing recent visits within past 7 days and meeting all other requirements  Today's Visits  Date Type Provider Dept   11/01/22 Telemedicine Kim Verma 15 Primary Care   Showing today's visits and meeting all other requirements  Future Appointments  No visits were found meeting these conditions  Showing future appointments within next 150 days and meeting all other requirements     This virtual check-in was done via [employer] and patient was informed that this is a secure, HIPAA-compliant platform  He agrees to proceed  Patient agrees to participate in a virtual check in via telephone or video visit instead of presenting to the office to address urgent/immediate medical needs  Patient is aware this is a billable service   He acknowledged consent and understanding of privacy and security of the video platform  The patient has agreed to participate and understands they can discontinue the visit at any time  After connecting through [facility], the patient was identified by name and date of birth  Armand Meléndez was informed that this was a telemedicine visit and that the exam was being conducted confidentially over secure lines  Armand Meléndez acknowledged consent and understanding of privacy and security of the telemedicine visit  I informed the patient that I have reviewed his record in Epic and presented the opportunity for him to ask any questions regarding the visit today  The patient agreed to participate  Verification of patient location:  Patient is located in the following state in which I hold an active license: [place]    Subjective: Armand Meléndez is a 61 y o  male who is concerned about COVID-19  Patient's symptoms include rhinorrhea, sore throat, cough and headache  Patient denies fever        - Date of symptom onset: 10/29/2022      COVID-19 vaccination status: Not vaccinated    Discussed Tx options; outpatient therapy which includes over-the-counter medications specific to symptomatology, vitamin C 500 mg b i d , zinc  mg daily, and vitamin D 1000 IU daily, hydration, and continue with isolation recommendations; staying ins sick room, avoid contact with other in house, wipe commonly touched surfaces verse paxlovid  Pt is aware of the limiting timeframe for paxlovid and elects to Tx COVID with conservative measures above  Lab Results   Component Value Date    SARSCOV2 Negative 10/16/2021    SARSCOV2 Negative 02/12/2021    [facility] Positive (Patient Reported) (A) 11/01/2022       Review of Systems   Constitutional: Negative for fever  HENT: Positive for rhinorrhea, sinus pressure and sore throat  Respiratory: Positive for cough  Neurological: Positive for headaches     All other systems reviewed and are negative  No current outpatient medications on file prior to visit  Objective:    Temp 97 9 °F (36 6 °C) (Tympanic)   Ht 5' 4" (1 626 m)   Wt 69 4 kg (153 lb)   BMI 26 26 kg/m²      Physical Exam  Vitals and nursing note reviewed  Constitutional:       Appearance: Normal appearance  HENT:      Head: Normocephalic  Nose: Nose normal       Mouth/Throat:      Mouth: Mucous membranes are moist    Cardiovascular:      Rate and Rhythm: Normal rate  Pulses: Normal pulses  Pulmonary:      Effort: Pulmonary effort is normal    Neurological:      General: No focal deficit present  Mental Status: He is alert     Psychiatric:         Mood and Affect: Mood normal        [place] Severe, CRNP

## 2022-11-01 NOTE — PATIENT INSTRUCTIONS
Updated CDC isolation:    An unboostered  COVID positive individual (without 3rd vaccine dose) should isolate for 5 days then wear mask for 5 days, this includes the COVID positive individual to mask within the house with family around after the 5 day isolation from the family  The vaccinated unboostered asymptomatic family do not need to be tested as they are not near the COVID positive family member  Family member may test with OTC antigen test if they wish, positive antigen test is considered positive COVID and that individual will practice isolation as above and PCR is not recommended [facility]  The exposed asymptomatic COVID vaccinated without 3rd dose family member should quarantine (stay at home while the COVID positive family member is isolated from asymptomatic family member(s))  CDC now recommends quarantine for 5 days followed by strict mask use for an additional 5 days  Alternatively, if a 5-day quarantine is not feasible, it is imperative that an exposed person wear a well-fitting mask at all times when around others for 10 days after exposure  For all those exposed, best practice would also include a test for COVID at day 5 after exposure (antigen OR PCR)  If symptoms occur, individuals should immediately quarantine until a negative test confirms symptoms are not attributable to [place]  After your quarantine (paragraph above) you are able to return to work and continue to wear a mask as described in paragraph above  If you experience symptoms anytime during your 5 day quarantine (staying home) set up a virtual visit and we will do a PCR at that time  Or you can do the antigen test as above and if positive  follow the isolation protocol outlined above

## 2023-02-10 ENCOUNTER — ANESTHESIA EVENT (OUTPATIENT)
Dept: GASTROENTEROLOGY | Facility: HOSPITAL | Age: 58
End: 2023-02-10

## 2023-02-10 ENCOUNTER — HOSPITAL ENCOUNTER (OUTPATIENT)
Dept: GASTROENTEROLOGY | Facility: HOSPITAL | Age: 58
Setting detail: OUTPATIENT SURGERY
Discharge: HOME | End: 2023-02-10
Admitting: INTERNAL MEDICINE

## 2023-02-10 ENCOUNTER — ANESTHESIA (OUTPATIENT)
Dept: GASTROENTEROLOGY | Facility: HOSPITAL | Age: 58
End: 2023-02-10

## 2023-02-10 VITALS
SYSTOLIC BLOOD PRESSURE: 101 MMHG | OXYGEN SATURATION: 99 % | HEART RATE: 88 BPM | HEIGHT: 64 IN | DIASTOLIC BLOOD PRESSURE: 61 MMHG | TEMPERATURE: 98.5 F | RESPIRATION RATE: 20 BRPM | WEIGHT: 153.88 LBS | BODY MASS INDEX: 26.27 KG/M2

## 2023-02-10 DIAGNOSIS — Z12.11 SCREEN FOR COLON CANCER: ICD-10-CM

## 2023-02-10 RX ORDER — PROPOFOL 10 MG/ML
INJECTION, EMULSION INTRAVENOUS AS NEEDED
Status: DISCONTINUED | OUTPATIENT
Start: 2023-02-10 | End: 2023-02-10

## 2023-02-10 RX ORDER — ONDANSETRON 2 MG/ML
4 INJECTION INTRAMUSCULAR; INTRAVENOUS ONCE AS NEEDED
Status: CANCELLED | OUTPATIENT
Start: 2023-02-10

## 2023-02-10 RX ORDER — LIDOCAINE HYDROCHLORIDE 10 MG/ML
INJECTION, SOLUTION EPIDURAL; INFILTRATION; INTRACAUDAL; PERINEURAL AS NEEDED
Status: DISCONTINUED | OUTPATIENT
Start: 2023-02-10 | End: 2023-02-10

## 2023-02-10 RX ORDER — SODIUM CHLORIDE, SODIUM LACTATE, POTASSIUM CHLORIDE, CALCIUM CHLORIDE 600; 310; 30; 20 MG/100ML; MG/100ML; MG/100ML; MG/100ML
INJECTION, SOLUTION INTRAVENOUS CONTINUOUS PRN
Status: DISCONTINUED | OUTPATIENT
Start: 2023-02-10 | End: 2023-02-10

## 2023-02-10 RX ADMIN — PROPOFOL 40 MG: 10 INJECTION, EMULSION INTRAVENOUS at 08:24

## 2023-02-10 RX ADMIN — PROPOFOL 40 MG: 10 INJECTION, EMULSION INTRAVENOUS at 08:26

## 2023-02-10 RX ADMIN — PROPOFOL 20 MG: 10 INJECTION, EMULSION INTRAVENOUS at 08:30

## 2023-02-10 RX ADMIN — PROPOFOL 20 MG: 10 INJECTION, EMULSION INTRAVENOUS at 08:32

## 2023-02-10 RX ADMIN — LIDOCAINE HYDROCHLORIDE 20 MG: 10 INJECTION, SOLUTION EPIDURAL; INFILTRATION; INTRACAUDAL; PERINEURAL at 08:22

## 2023-02-10 RX ADMIN — SODIUM CHLORIDE, SODIUM LACTATE, POTASSIUM CHLORIDE, AND CALCIUM CHLORIDE: .6; .31; .03; .02 INJECTION, SOLUTION INTRAVENOUS at 08:03

## 2023-02-10 RX ADMIN — PROPOFOL 100 MG: 10 INJECTION, EMULSION INTRAVENOUS at 08:22

## 2023-02-10 RX ADMIN — PROPOFOL 40 MG: 10 INJECTION, EMULSION INTRAVENOUS at 08:28

## 2023-02-10 NOTE — ANESTHESIA POSTPROCEDURE EVALUATION
Post-Op Assessment Note    CV Status:  Stable    Pain management: adequate     Mental Status:  Arousable and sleepy   Hydration Status:  Euvolemic   PONV Controlled:  Controlled   Airway Patency:  Patent      Post Op Vitals Reviewed: Yes      Staff: CRNA         No notable events documented      BP   95/57   Temp      Pulse 88   Resp 18   SpO2 98% RA

## 2023-02-10 NOTE — ANESTHESIA PREPROCEDURE EVALUATION
Procedure:  COLONOSCOPY    Relevant Problems   ANESTHESIA (within normal limits)      CARDIO (within normal limits)      PULMONARY (within normal limits)        Physical Exam    Airway    Mallampati score: II  TM Distance: >3 FB  Neck ROM: full     Dental       Cardiovascular  Rhythm: regular, Rate: normal,     Pulmonary  Breath sounds clear to auscultation,     Other Findings        Anesthesia Plan  ASA Score- 1     Anesthesia Type- IV sedation with anesthesia with ASA Monitors  Additional Monitors:   Airway Plan:           Plan Factors-Exercise tolerance (METS): >4 METS  Chart reviewed  Existing labs reviewed  Patient summary reviewed  Patient is not a current smoker  Induction-     Postoperative Plan-     Informed Consent- Anesthetic plan and risks discussed with patient  I personally reviewed this patient with the CRNA  Discussed and agreed on the Anesthesia Plan with the CRNA  Elissa Avilez

## 2023-02-10 NOTE — H&P
History and Physical -  [facility]  Kelsea Gerardo 61 y o  male MRN: [id number]      HPI: Kelsea Gerardo is a 61y o  year old male who presents for screening colonoscopy      REVIEW OF SYSTEMS: Per the HPI, and otherwise unremarkable  Historical Information   Past Medical History:   Diagnosis Date   • Arthritis    • Depression    • Kidney stone    • Testicular torsion     history   • Visual impairment     corrective lenses     Past Surgical History:   Procedure Laterality Date   • FRACTURE SURGERY Left 01/23/2018    calcaneus fracture   • HAND SURGERY Left     to remove glass piece   • KIDNEY STONE SURGERY      twice     Social History   Social History     Substance and Sexual Activity   Alcohol Use Not Currently     Social History     Substance and Sexual Activity   Drug Use Not Currently     Social History     Tobacco Use   Smoking Status Never   Smokeless Tobacco Never     Family History   Problem Relation Age of Onset   • No Known Problems Mother    • Heart disease Father    • Alcohol abuse Sister    • Alcohol abuse Brother        Meds/Allergies     (Not in a hospital admission)      No Known Allergies    Objective     Blood pressure 118/70, pulse 87, temperature 97 8 °F (36 6 °C), temperature source Temporal, resp  rate 15, height 5' 4" (1 626 m), weight 69 8 kg (153 lb 14 1 oz), SpO2 100 %  PHYSICAL EXAM    Gen: NAD  CV: RRR  CHEST: Clear  ABD: soft, NT/ND  EXT: no edema      ASSESSMENT/PLAN:  This is a 61y o  year old male here for colonoscopy, and he is stable and optimized for his procedure

## 2023-05-01 ENCOUNTER — HOSPITAL ENCOUNTER (EMERGENCY)
Facility: HOSPITAL | Age: 58
Discharge: HOME | End: 2023-05-01
Attending: EMERGENCY MEDICINE

## 2023-05-01 VITALS
DIASTOLIC BLOOD PRESSURE: 83 MMHG | RESPIRATION RATE: 16 BRPM | OXYGEN SATURATION: 98 % | WEIGHT: 153 LBS | BODY MASS INDEX: 26.12 KG/M2 | HEART RATE: 74 BPM | HEIGHT: 64 IN | TEMPERATURE: 97 F | SYSTOLIC BLOOD PRESSURE: 149 MMHG

## 2023-05-01 DIAGNOSIS — R59.1 LYMPHADENOPATHY: Primary | ICD-10-CM

## 2023-05-01 RX ORDER — AMOXICILLIN 500 MG/1
500 CAPSULE ORAL 3 TIMES DAILY
Qty: 30 CAPSULE | Refills: 0 | Status: SHIPPED | OUTPATIENT
Start: 2023-05-01 | End: 2023-05-11

## 2023-05-01 NOTE — ED PROVIDER NOTES
History  Chief Complaint   Patient presents with   • Neck Pain     Lump behind left ear that started Saturday  No sore throat or ear pain  Skin is slightly broken patient states it was itching last night  HPI      This is a very pleasant, nontoxic-appearing, 13-year-old gentleman presents emergency department with isolated neck pain right below his left earlobe going on for approximately 3 days, no history of trauma, no history of insect envenomation, dental pain, ear pain, fever, chills, nausea, vomiting  Patient was concerned because his wife looked up lymphadenopathy on the Internet and felt the patient should be evaluated  Prior to Admission Medications   Prescriptions Last Dose Informant Patient Reported? Taking?   fluticasone (FLONASE) 50 mcg/act nasal spray   No No   Si spray into each nostril daily      Facility-Administered Medications: None       Past Medical History:   Diagnosis Date   • Arthritis    • Depression    • Kidney stone    • Testicular torsion     history   • Visual impairment     corrective lenses       Past Surgical History:   Procedure Laterality Date   • FRACTURE SURGERY Left 2018    calcaneus fracture   • HAND SURGERY Left     to remove glass piece   • KIDNEY STONE SURGERY      twice       Family History   Problem Relation Age of Onset   • No Known Problems Mother    • Heart disease Father    • Alcohol abuse Sister    • Alcohol abuse Brother      I have reviewed and agree with the history as documented      E-Cigarette/Vaping   • E-Cigarette Use Never User      E-Cigarette/Vaping Substances   • Nicotine No    • THC No    • CBD No    • Flavoring No    • Other No    • Unknown No      Social History     Tobacco Use   • Smoking status: Never   • Smokeless tobacco: Never   Vaping Use   • Vaping Use: Never used   Substance Use Topics   • Alcohol use: Not Currently   • Drug use: Not Currently       Review of Systems   Constitutional: Negative for chills, diaphoresis, fatigue and fever    HENT: Negative  Negative for drooling, ear pain, facial swelling, hearing loss, mouth sores, nosebleeds, postnasal drip, rhinorrhea, sinus pressure, sinus pain, sneezing, sore throat, tinnitus, trouble swallowing and voice change  Eyes: Negative  Negative for pain  Respiratory: Negative  Negative for shortness of breath  Cardiovascular: Negative  Negative for chest pain and palpitations  Gastrointestinal: Negative  Endocrine: Negative  Genitourinary: Negative  Musculoskeletal: Positive for neck pain  Skin: Negative  Allergic/Immunologic: Negative  Neurological: Negative  Hematological: Negative  Psychiatric/Behavioral: Negative  Physical Exam  Physical Exam  Vitals and nursing note reviewed  Constitutional:       Appearance: Normal appearance  He is normal weight  HENT:      Head: Normocephalic and atraumatic  Right Ear: External ear normal       Left Ear: External ear normal       Nose: Nose normal       Mouth/Throat:      Mouth: Mucous membranes are moist       Pharynx: Oropharynx is clear  Eyes:      Extraocular Movements: Extraocular movements intact  Conjunctiva/sclera: Conjunctivae normal       Pupils: Pupils are equal, round, and reactive to light  Neck:      Thyroid: No thyroid mass, thyromegaly or thyroid tenderness  Vascular: No carotid bruit  Trachea: Trachea and phonation normal         Comments: L sided small nonindurated, nonerythematous lymphadenopathy the tonsillar area on the left side  Patient maintaining airway maintaining secretions  No stridor  No brawniness under the tongue  Uvula midline without edema  Cardiovascular:      Rate and Rhythm: Normal rate and regular rhythm  Pulses: Normal pulses  Heart sounds: Normal heart sounds  Pulmonary:      Effort: Pulmonary effort is normal       Breath sounds: Normal breath sounds  Abdominal:      General: Abdomen is flat   Bowel sounds are normal  Palpations: Abdomen is soft  Musculoskeletal:         General: No swelling, tenderness, deformity or signs of injury  Normal range of motion  Cervical back: Normal range of motion and neck supple  No edema, erythema, signs of trauma, rigidity, torticollis or crepitus  No pain with movement, spinous process tenderness or muscular tenderness  Normal range of motion  Right lower leg: No edema  Left lower leg: No edema  Skin:     General: Skin is warm  Capillary Refill: Capillary refill takes less than 2 seconds  Neurological:      General: No focal deficit present  Mental Status: He is alert and oriented to person, place, and time  Mental status is at baseline  Psychiatric:         Mood and Affect: Mood normal          Behavior: Behavior normal          Thought Content: Thought content normal          Judgment: Judgment normal          Vital Signs  ED Triage Vitals   Temperature Pulse Respirations Blood Pressure SpO2   05/01/23 1110 05/01/23 1108 05/01/23 1108 05/01/23 1108 05/01/23 1108   (!) 97 °F (36 1 °C) 74 16 149/83 98 %      Temp Source Heart Rate Source Patient Position - Orthostatic VS BP Location FiO2 (%)   05/01/23 1110 05/01/23 1108 05/01/23 1108 05/01/23 1108 --   Tympanic Monitor Sitting Left arm       Pain Score       05/01/23 1108       6           Vitals:    05/01/23 1108   BP: 149/83   Pulse: 74   Patient Position - Orthostatic VS: Sitting         Visual Acuity      ED Medications  Medications - No data to display    Diagnostic Studies  Results Reviewed     None                 No orders to display              Procedures  Procedures         ED Course  ED Course as of 05/01/23 1200   Mon May 01, 2023   1144 Patient seen and examined, consistent with isolated tonsillar adenopathy on the left side painful, not associated with any night sweats, weight loss, fatigue  Start the patient on antibiotics and follow-up with ENT as needed                                 SBIRT "22yo+    Flowsheet Row Most Recent Value   Initial Alcohol Screen: US AUDIT-C     1  How often do you have a drink containing alcohol? 0 Filed at: 05/01/2023 1109   2  How many drinks containing alcohol do you have on a typical day you are drinking? 0 Filed at: 05/01/2023 1109   3a  Male UNDER 65: How often do you have five or more drinks on one occasion? 0 Filed at: 05/01/2023 1109   Audit-C Score 0 Filed at: 05/01/2023 1109   ELLA: How many times in the past year have you    Used an illegal drug or used a prescription medication for non-medical reasons? Never Filed at: 05/01/2023 1109                    Medical Decision Making  36-hour history of left tonsillar lymphadenopathy, no sore throat, painful nature, tender upon palpation, does not appear to be an abscess, no induration erythema over lying the area, no B symptoms at night, no weight loss, no night sweats, patient stable for discharge start antibiotics follow-up with ENT as needed  Portions of the record may have been created with voice recognition software  Occasional wrong word or \"sound a like\" substitutions may have occurred due to the inherent limitations of voice recognition software  Read the chart carefully and recognize, using context, where substitutions have occurred  Counseling: I had a detailed discussion with the patient and/or guardian regarding: the historical points, exam findings, and any diagnostic results supporting the discharge diagnosis, lab results, radiology results, discharge instructions reviewed with patient and/or family/caregiver and understanding was verbalized  Instructions given to return to the emergency department if symptoms worsen or persist, or if there are any questions or concerns that arise at home      Risk  Prescription drug management            Disposition  Final diagnoses:   Lymphadenopathy     Time reflects when diagnosis was documented in both MDM as applicable and the Disposition within this note  " Time User Action Codes Description Comment    5/1/2023 11:45 AM Lashae Nieto Add [R59 1] Lymphadenopathy       ED Disposition     ED Disposition   Discharge    Condition   Stable    Date/Time   Mon May 1, 2023 11:45 AM    Comment   Lisbeth Craig discharge to home/self care                 Follow-up Information     Follow up With Specialties Details Why [place] [address], [address] Internal Medicine, Nurse Practitioner   [address] [facility] [address] [id number]  [facility] [address], DO Otolaryngology   [address]  [address] [facility] [address]  [phone]            Discharge Medication List as of 5/1/2023 11:49 AM      START taking these medications    Details   amoxicillin (AMOXIL) 500 mg capsule Take 1 capsule (500 mg total) by mouth 3 (three) times a day for 10 days, Starting Mon 5/1/2023, Until Thu 5/11/2023, Normal         CONTINUE these medications which have NOT CHANGED    Details   fluticasone (FLONASE) 50 mcg/act nasal spray 1 spray into each nostril daily, Starting Tue 11/1/2022, Normal                 PDMP Review     None          ED Provider  Electronically Signed by           Epifanio Mccrary III, DO  05/01/23 1200

## 2023-09-19 ENCOUNTER — APPOINTMENT (OUTPATIENT)
Dept: LAB | Facility: CLINIC | Age: 58
End: 2023-09-19
Payer: COMMERCIAL

## 2023-09-19 DIAGNOSIS — Z13.1 SCREENING FOR DIABETES MELLITUS (DM): ICD-10-CM

## 2023-09-19 DIAGNOSIS — Z13.0 SCREENING FOR DEFICIENCY ANEMIA: ICD-10-CM

## 2023-09-19 DIAGNOSIS — Z13.29 SCREENING FOR THYROID DISORDER: ICD-10-CM

## 2023-09-19 DIAGNOSIS — Z13.220 SCREENING FOR HYPERLIPIDEMIA: ICD-10-CM

## 2023-09-19 LAB
ALBUMIN SERPL BCP-MCNC: 4.4 G/DL (ref 3.5–5)
ALP SERPL-CCNC: 75 U/L (ref 34–104)
ALT SERPL W P-5'-P-CCNC: 47 U/L (ref 7–52)
ANION GAP SERPL CALCULATED.3IONS-SCNC: 9 MMOL/L
AST SERPL W P-5'-P-CCNC: 29 U/L (ref 13–39)
BASOPHILS # BLD AUTO: 0.05 THOUSANDS/ÂΜL (ref 0–0.1)
BASOPHILS NFR BLD AUTO: 1 % (ref 0–1)
BILIRUB SERPL-MCNC: 0.68 MG/DL (ref 0.2–1)
BUN SERPL-MCNC: 13 MG/DL (ref 5–25)
CALCIUM SERPL-MCNC: 10 MG/DL (ref 8.4–10.2)
CHLORIDE SERPL-SCNC: 104 MMOL/L (ref 96–108)
CHOLEST SERPL-MCNC: 185 MG/DL
CO2 SERPL-SCNC: 27 MMOL/L (ref 21–32)
CREAT SERPL-MCNC: 0.91 MG/DL (ref 0.6–1.3)
EOSINOPHIL # BLD AUTO: 0.42 THOUSAND/ÂΜL (ref 0–0.61)
EOSINOPHIL NFR BLD AUTO: 6 % (ref 0–6)
ERYTHROCYTE [DISTWIDTH] IN BLOOD BY AUTOMATED COUNT: 12.5 % (ref 11.6–15.1)
GFR SERPL CREATININE-BSD FRML MDRD: 91 ML/MIN/1.73SQ M
GLUCOSE P FAST SERPL-MCNC: 82 MG/DL (ref 65–99)
HCT VFR BLD AUTO: 45.5 % (ref 36.5–49.3)
HDLC SERPL-MCNC: 39 MG/DL
HGB BLD-MCNC: 14.9 G/DL (ref 12–17)
IMM GRANULOCYTES # BLD AUTO: 0.03 THOUSAND/UL (ref 0–0.2)
IMM GRANULOCYTES NFR BLD AUTO: 0 % (ref 0–2)
LDLC SERPL CALC-MCNC: 124 MG/DL (ref 0–100)
LYMPHOCYTES # BLD AUTO: 3.02 THOUSANDS/ÂΜL (ref 0.6–4.47)
LYMPHOCYTES NFR BLD AUTO: 45 % (ref 14–44)
MCH RBC QN AUTO: 32.3 PG (ref 26.8–34.3)
MCHC RBC AUTO-ENTMCNC: 32.7 G/DL (ref 31.4–37.4)
MCV RBC AUTO: 99 FL (ref 82–98)
MONOCYTES # BLD AUTO: 0.64 THOUSAND/ÂΜL (ref 0.17–1.22)
MONOCYTES NFR BLD AUTO: 9 % (ref 4–12)
NEUTROPHILS # BLD AUTO: 2.69 THOUSANDS/ÂΜL (ref 1.85–7.62)
NEUTS SEG NFR BLD AUTO: 39 % (ref 43–75)
NRBC BLD AUTO-RTO: 0 /100 WBCS
PLATELET # BLD AUTO: 294 THOUSANDS/UL (ref 149–390)
PMV BLD AUTO: 9.7 FL (ref 8.9–12.7)
POTASSIUM SERPL-SCNC: 4.4 MMOL/L (ref 3.5–5.3)
PROT SERPL-MCNC: 7.1 G/DL (ref 6.4–8.4)
RBC # BLD AUTO: 4.62 MILLION/UL (ref 3.88–5.62)
SODIUM SERPL-SCNC: 140 MMOL/L (ref 135–147)
TRIGL SERPL-MCNC: 110 MG/DL
TSH SERPL DL<=0.05 MIU/L-ACNC: 3.45 UIU/ML (ref 0.45–4.5)
WBC # BLD AUTO: 6.85 THOUSAND/UL (ref 4.31–10.16)

## 2023-09-19 PROCEDURE — 80061 LIPID PANEL: CPT

## 2023-09-19 PROCEDURE — 80053 COMPREHEN METABOLIC PANEL: CPT

## 2023-09-19 PROCEDURE — 85025 COMPLETE CBC W/AUTO DIFF WBC: CPT

## 2023-09-19 PROCEDURE — 36415 COLL VENOUS BLD VENIPUNCTURE: CPT

## 2023-09-19 PROCEDURE — 84443 ASSAY THYROID STIM HORMONE: CPT

## 2023-09-21 ENCOUNTER — OFFICE VISIT (OUTPATIENT)
Dept: FAMILY MEDICINE CLINIC | Facility: CLINIC | Age: 58
End: 2023-09-21
Payer: COMMERCIAL

## 2023-09-21 VITALS
OXYGEN SATURATION: 100 % | DIASTOLIC BLOOD PRESSURE: 86 MMHG | TEMPERATURE: 98.6 F | BODY MASS INDEX: 27.31 KG/M2 | SYSTOLIC BLOOD PRESSURE: 112 MMHG | HEART RATE: 60 BPM | WEIGHT: 160 LBS | HEIGHT: 64 IN

## 2023-09-21 DIAGNOSIS — Z00.00 ANNUAL PHYSICAL EXAM: Primary | ICD-10-CM

## 2023-09-21 DIAGNOSIS — Z12.5 SCREENING FOR MALIGNANT NEOPLASM OF PROSTATE: ICD-10-CM

## 2023-09-21 DIAGNOSIS — Z13.220 SCREENING FOR LIPID DISORDERS: ICD-10-CM

## 2023-09-21 DIAGNOSIS — Z13.31 DEPRESSION SCREENING NEGATIVE: ICD-10-CM

## 2023-09-21 DIAGNOSIS — Z13.0 SCREENING FOR DEFICIENCY ANEMIA: ICD-10-CM

## 2023-09-21 DIAGNOSIS — Z13.29 SCREENING FOR THYROID DISORDER: ICD-10-CM

## 2023-09-21 DIAGNOSIS — E66.3 OVERWEIGHT WITH BODY MASS INDEX (BMI) OF 26 TO 26.9 IN ADULT: ICD-10-CM

## 2023-09-21 DIAGNOSIS — Z28.21 INFLUENZA VACCINATION DECLINED: ICD-10-CM

## 2023-09-21 DIAGNOSIS — Z13.1 SCREENING FOR DIABETES MELLITUS: ICD-10-CM

## 2023-09-21 PROCEDURE — 99396 PREV VISIT EST AGE 40-64: CPT | Performed by: NURSE PRACTITIONER

## 2025-02-24 DIAGNOSIS — Z13.220 SCREENING FOR LIPID DISORDERS: ICD-10-CM

## 2025-02-24 DIAGNOSIS — D50.8 IRON DEFICIENCY ANEMIA SECONDARY TO INADEQUATE DIETARY IRON INTAKE: ICD-10-CM

## 2025-02-24 DIAGNOSIS — Z13.0 SCREENING FOR DEFICIENCY ANEMIA: ICD-10-CM

## 2025-02-24 DIAGNOSIS — Z13.29 SCREENING FOR THYROID DISORDER: ICD-10-CM

## 2025-02-24 DIAGNOSIS — Z13.1 SCREENING FOR DIABETES MELLITUS: Primary | ICD-10-CM

## 2025-02-24 DIAGNOSIS — Z12.5 SCREENING FOR PROSTATE CANCER: ICD-10-CM

## 2025-02-25 ENCOUNTER — APPOINTMENT (OUTPATIENT)
Dept: LAB | Facility: CLINIC | Age: 60
End: 2025-02-25
Payer: COMMERCIAL

## 2025-02-25 DIAGNOSIS — E03.8 OTHER SPECIFIED HYPOTHYROIDISM: ICD-10-CM

## 2025-02-25 DIAGNOSIS — Z13.1 SCREENING FOR DIABETES MELLITUS: ICD-10-CM

## 2025-02-25 DIAGNOSIS — Z12.5 SCREENING FOR PROSTATE CANCER: ICD-10-CM

## 2025-02-25 DIAGNOSIS — E78.2 MIXED HYPERLIPIDEMIA: ICD-10-CM

## 2025-02-25 DIAGNOSIS — Z00.00 ANNUAL PHYSICAL EXAM: ICD-10-CM

## 2025-02-25 DIAGNOSIS — D50.8 IRON DEFICIENCY ANEMIA SECONDARY TO INADEQUATE DIETARY IRON INTAKE: ICD-10-CM

## 2025-02-25 DIAGNOSIS — Z13.29 SCREENING FOR THYROID DISORDER: ICD-10-CM

## 2025-02-25 LAB
ALBUMIN SERPL BCG-MCNC: 4.2 G/DL (ref 3.5–5)
ALP SERPL-CCNC: 88 U/L (ref 34–104)
ALT SERPL W P-5'-P-CCNC: 52 U/L (ref 7–52)
ANION GAP SERPL CALCULATED.3IONS-SCNC: 11 MMOL/L (ref 4–13)
AST SERPL W P-5'-P-CCNC: 32 U/L (ref 13–39)
BASOPHILS # BLD AUTO: 0.07 THOUSANDS/ÂΜL (ref 0–0.1)
BASOPHILS NFR BLD AUTO: 1 % (ref 0–1)
BILIRUB SERPL-MCNC: 0.5 MG/DL (ref 0.2–1)
BUN SERPL-MCNC: 11 MG/DL (ref 5–25)
CALCIUM SERPL-MCNC: 9.5 MG/DL (ref 8.4–10.2)
CHLORIDE SERPL-SCNC: 104 MMOL/L (ref 96–108)
CHOLEST SERPL-MCNC: 203 MG/DL (ref ?–200)
CO2 SERPL-SCNC: 24 MMOL/L (ref 21–32)
CREAT SERPL-MCNC: 0.81 MG/DL (ref 0.6–1.3)
EOSINOPHIL # BLD AUTO: 0.54 THOUSAND/ÂΜL (ref 0–0.61)
EOSINOPHIL NFR BLD AUTO: 7 % (ref 0–6)
ERYTHROCYTE [DISTWIDTH] IN BLOOD BY AUTOMATED COUNT: 12.3 % (ref 11.6–15.1)
EST. AVERAGE GLUCOSE BLD GHB EST-MCNC: 131 MG/DL
FERRITIN SERPL-MCNC: 102 NG/ML (ref 24–336)
GFR SERPL CREATININE-BSD FRML MDRD: 96 ML/MIN/1.73SQ M
GLUCOSE P FAST SERPL-MCNC: 85 MG/DL (ref 65–99)
HBA1C MFR BLD: 6.2 %
HCT VFR BLD AUTO: 45.4 % (ref 36.5–49.3)
HDLC SERPL-MCNC: 43 MG/DL
HGB BLD-MCNC: 15 G/DL (ref 12–17)
IMM GRANULOCYTES # BLD AUTO: 0.03 THOUSAND/UL (ref 0–0.2)
IMM GRANULOCYTES NFR BLD AUTO: 0 % (ref 0–2)
IRON SATN MFR SERPL: 21 % (ref 15–50)
IRON SERPL-MCNC: 68 UG/DL (ref 50–212)
LDLC SERPL CALC-MCNC: 133 MG/DL (ref 0–100)
LYMPHOCYTES # BLD AUTO: 3.19 THOUSANDS/ÂΜL (ref 0.6–4.47)
LYMPHOCYTES NFR BLD AUTO: 44 % (ref 14–44)
MCH RBC QN AUTO: 32.6 PG (ref 26.8–34.3)
MCHC RBC AUTO-ENTMCNC: 33 G/DL (ref 31.4–37.4)
MCV RBC AUTO: 99 FL (ref 82–98)
MONOCYTES # BLD AUTO: 0.75 THOUSAND/ÂΜL (ref 0.17–1.22)
MONOCYTES NFR BLD AUTO: 10 % (ref 4–12)
NEUTROPHILS # BLD AUTO: 2.8 THOUSANDS/ÂΜL (ref 1.85–7.62)
NEUTS SEG NFR BLD AUTO: 38 % (ref 43–75)
NRBC BLD AUTO-RTO: 0 /100 WBCS
PLATELET # BLD AUTO: 309 THOUSANDS/UL (ref 149–390)
PMV BLD AUTO: 9.7 FL (ref 8.9–12.7)
POTASSIUM SERPL-SCNC: 3.9 MMOL/L (ref 3.5–5.3)
PROT SERPL-MCNC: 7 G/DL (ref 6.4–8.4)
PSA SERPL-MCNC: 1.32 NG/ML (ref 0–4)
RBC # BLD AUTO: 4.6 MILLION/UL (ref 3.88–5.62)
SODIUM SERPL-SCNC: 139 MMOL/L (ref 135–147)
TIBC SERPL-MCNC: 319.2 UG/DL (ref 250–450)
TRANSFERRIN SERPL-MCNC: 228 MG/DL (ref 203–362)
TRIGL SERPL-MCNC: 135 MG/DL (ref ?–150)
TSH SERPL DL<=0.05 MIU/L-ACNC: 3.06 UIU/ML (ref 0.45–4.5)
UIBC SERPL-MCNC: 251 UG/DL (ref 155–355)
WBC # BLD AUTO: 7.38 THOUSAND/UL (ref 4.31–10.16)

## 2025-02-25 PROCEDURE — G0103 PSA SCREENING: HCPCS

## 2025-02-25 PROCEDURE — 83036 HEMOGLOBIN GLYCOSYLATED A1C: CPT

## 2025-02-25 PROCEDURE — 83550 IRON BINDING TEST: CPT

## 2025-02-25 PROCEDURE — 36415 COLL VENOUS BLD VENIPUNCTURE: CPT

## 2025-02-25 PROCEDURE — 84443 ASSAY THYROID STIM HORMONE: CPT

## 2025-02-25 PROCEDURE — 83540 ASSAY OF IRON: CPT

## 2025-02-25 PROCEDURE — 82728 ASSAY OF FERRITIN: CPT

## 2025-02-25 PROCEDURE — 80061 LIPID PANEL: CPT

## 2025-02-25 PROCEDURE — 80053 COMPREHEN METABOLIC PANEL: CPT

## 2025-02-25 PROCEDURE — 85025 COMPLETE CBC W/AUTO DIFF WBC: CPT

## 2025-02-27 ENCOUNTER — OFFICE VISIT (OUTPATIENT)
Dept: FAMILY MEDICINE CLINIC | Facility: CLINIC | Age: 60
End: 2025-02-27
Payer: COMMERCIAL

## 2025-02-27 VITALS
HEART RATE: 73 BPM | SYSTOLIC BLOOD PRESSURE: 128 MMHG | DIASTOLIC BLOOD PRESSURE: 82 MMHG | BODY MASS INDEX: 28.07 KG/M2 | RESPIRATION RATE: 16 BRPM | HEIGHT: 64 IN | TEMPERATURE: 96.6 F | OXYGEN SATURATION: 98 % | WEIGHT: 164.4 LBS

## 2025-02-27 DIAGNOSIS — Z12.5 SCREENING FOR PROSTATE CANCER: ICD-10-CM

## 2025-02-27 DIAGNOSIS — Z13.29 SCREENING FOR THYROID DISORDER: ICD-10-CM

## 2025-02-27 DIAGNOSIS — E78.2 MIXED HYPERLIPIDEMIA: Primary | ICD-10-CM

## 2025-02-27 DIAGNOSIS — D50.8 IRON DEFICIENCY ANEMIA SECONDARY TO INADEQUATE DIETARY IRON INTAKE: ICD-10-CM

## 2025-02-27 DIAGNOSIS — Z13.1 SCREENING FOR DIABETES MELLITUS: ICD-10-CM

## 2025-02-27 DIAGNOSIS — Z00.00 ANNUAL PHYSICAL EXAM: ICD-10-CM

## 2025-02-27 DIAGNOSIS — E03.8 OTHER SPECIFIED HYPOTHYROIDISM: ICD-10-CM

## 2025-02-27 PROBLEM — H35.30 MACULAR DEGENERATION OF BOTH EYES: Status: ACTIVE | Noted: 2025-02-27

## 2025-02-27 PROBLEM — E66.3 OVERWEIGHT WITH BODY MASS INDEX (BMI) OF 26 TO 26.9 IN ADULT: Status: RESOLVED | Noted: 2022-09-15 | Resolved: 2025-02-27

## 2025-02-27 PROCEDURE — 99396 PREV VISIT EST AGE 40-64: CPT

## 2025-02-27 RX ORDER — KETOCONAZOLE 20 MG/G
CREAM TOPICAL AS NEEDED
COMMUNITY
Start: 2024-12-26

## 2025-02-27 NOTE — PROGRESS NOTES
Adult Annual Physical  Name: Willy Talavera      : 1965      MRN: [id number]  Encounter Provider: Reina Prince PA-C  Encounter Date: 2025   Encounter department: [facility]    Assessment & Plan  Annual physical exam  Immunizations and preventive care screenings were discussed with patient today.   Appropriate education was printed on patient's after visit summary.  Patient presents to establish care and for routine physical.   Physical exam unremarkable. BP WNL.   Ordered routine labs.   No chronic medical conditions; takes no daily medications.       Orders:    CBC and differential; Future    Comprehensive metabolic panel; Future    Mixed hyperlipidemia  -Last lipid panel shows hyperlipidemia   -I recommended plenty of fruits/veggies/whole grains, regular physical activity as able, and cutting back on meat/dairy/fried/processed foods.   -Will check lipids again in 6 months     Orders:    Lipid Panel with Direct LDL reflex; Future    Lipid Panel with Direct LDL reflex; Future    Screening for diabetes mellitus  -Last A1C 6.2  -Advised on diet changes and avoiding foods and drinks with added sugars and carbs  -Will check A1C in 6 months   Orders:    Hemoglobin A1C; Future    Hemoglobin A1C; Future    Iron deficiency anemia secondary to inadequate dietary iron intake    Orders:    Iron Panel (Includes Ferritin, Iron Sat%, Iron, and TIBC); Future    Screening for prostate cancer    Orders:    PSA, Total Screen; Future    Screening for thyroid disorder    Orders:    TSH, 3rd generation with Free T4 reflex; Future    Other specified hypothyroidism    Orders:    TSH, 3rd generation with Free T4 reflex; Future    Immunizations and preventive care screenings were discussed with patient today. Appropriate education was printed on patient's after visit summary.    Discussed risks and benefits of prostate cancer screening. We discussed the controversial history of PSA screening for  prostate cancer in the [place] as well as the risk of over detection and over treatment of prostate cancer by way of PSA screening.  The patient understands that PSA blood testing is an imperfect way to screen for prostate cancer and that elevated PSA levels in the blood may also be caused by infection, inflammation, prostatic trauma or manipulation, urological procedures, or by benign prostatic enlargement.    The role of the digital rectal examination in prostate cancer screening was also discussed and I discussed with him that there is large interobserver variability in the findings of digital rectal examination.    Counseling:  Alcohol/drug use: discussed moderation in alcohol intake, the recommendations for healthy alcohol use, and avoidance of illicit drug use.  Dental Health: discussed importance of regular tooth brushing, flossing, and dental visits.  Injury prevention: discussed safety/seat belts, safety helmets, smoke detectors, carbon monoxide detectors, and smoking near bedding or upholstery.  Sexual health: discussed sexually transmitted diseases, partner selection, use of condoms, avoidance of unintended pregnancy, and contraceptive alternatives.  Exercise: the importance of regular exercise/physical activity was discussed. Recommend exercise 3-5 times per week for at least 30 minutes.          History of Present Illness     Adult Annual Physical:  Patient presents for annual physical. Patient is a 59-year-old male who presents today to establish care and for annual physical. He states that he has been feeling well with no acute concerns today. Denies chest pain, SOB, abdominal pain, changes in bowels..     Diet and Physical Activity:  - Diet/Nutrition: well balanced diet, limited junk food, high fat diet and consuming 3-5 servings of fruits/vegetables daily.  - Exercise: strength training exercises and moderate cardiovascular exercise.    Depression Screening:  - PHQ-2 Score: 0  - PHQ-9 Score:  "0    General Health:  - Sleep: sleeps well and 4-6 hours of sleep on average.  - Hearing: normal hearing bilateral ears.  - Vision: wears glasses and most recent eye exam > 1 year ago.  - Dental: brushes teeth twice daily and no dental visits for > 1 year.     Health:  - History of STDs: no.   - Urinary symptoms: none.     Review of Systems   Constitutional:  Negative for chills, fatigue and fever.   HENT:  Negative for congestion, ear pain and sore throat.    Eyes:  Negative for pain.   Respiratory:  Negative for cough, chest tightness and shortness of breath.    Cardiovascular:  Negative for chest pain and palpitations.   Gastrointestinal:  Negative for abdominal pain, constipation, diarrhea, nausea and vomiting.   Genitourinary:  Negative for difficulty urinating and dysuria.   Musculoskeletal:  Negative for arthralgias and back pain.   Skin:  Negative for color change and rash.   Neurological:  Negative for syncope and headaches.   All other systems reviewed and are negative.    Current Outpatient Medications on File Prior to Visit   Medication Sig Dispense Refill    ketoconazole (NIZORAL) 2 % cream Apply topically if needed       No current facility-administered medications on file prior to visit.        Objective   /82 (Patient Position: Sitting, Cuff Size: Standard)   Pulse 73   Temp (!) 96.6 °F (35.9 °C) (Tympanic)   Resp 16   Ht 5' 4\" (1.626 m)   Wt 74.6 kg (164 lb 6.4 oz)   SpO2 98%   BMI 28.22 kg/m²     Physical Exam  Vitals and nursing note reviewed.   Constitutional:       General: He is not in acute distress.     Appearance: Normal appearance. He is well-developed.   HENT:      Head: Normocephalic and atraumatic.      Right Ear: Tympanic membrane normal.      Left Ear: Tympanic membrane normal.      Nose: Nose normal.      Mouth/Throat:      Mouth: Mucous membranes are moist.   Eyes:      Conjunctiva/sclera: Conjunctivae normal.   Cardiovascular:      Rate and Rhythm: Normal rate and " regular rhythm.      Heart sounds: Normal heart sounds. No murmur heard.  Pulmonary:      Effort: Pulmonary effort is normal. No respiratory distress.      Breath sounds: Normal breath sounds. No wheezing or rhonchi.   Abdominal:      Palpations: Abdomen is soft.      Tenderness: There is no abdominal tenderness.   Musculoskeletal:         General: No swelling.      Cervical back: Neck supple.   Skin:     General: Skin is warm and dry.      Capillary Refill: Capillary refill takes less than 2 seconds.   Neurological:      Mental Status: He is alert and oriented to person, place, and time.   Psychiatric:         Mood and Affect: Mood normal.         Behavior: Behavior normal.         Thought Content: Thought content normal.         Judgment: Judgment normal.       Administrative Statements   I have spent a total time of 35 minutes in caring for this patient on the day of the visit/encounter including Diagnostic results, Prognosis, Risks and benefits of tx options, Instructions for management, Patient and family education, Importance of tx compliance, Risk factor reductions, Impressions, Counseling / Coordination of care, Documenting in the medical record, Reviewing/placing orders in the medical record (including tests, medications, and/or procedures), and Obtaining or reviewing history  .

## 2025-05-12 ENCOUNTER — HOSPITAL ENCOUNTER (EMERGENCY)
Facility: HOSPITAL | Age: 60
Discharge: HOME | End: 2025-05-12
Attending: EMERGENCY MEDICINE
Payer: COMMERCIAL

## 2025-05-12 ENCOUNTER — APPOINTMENT (EMERGENCY)
Dept: CT IMAGING | Facility: HOSPITAL | Age: 60
End: 2025-05-12
Payer: COMMERCIAL

## 2025-05-12 ENCOUNTER — APPOINTMENT (EMERGENCY)
Dept: RADIOLOGY | Facility: HOSPITAL | Age: 60
End: 2025-05-12
Payer: COMMERCIAL

## 2025-05-12 VITALS
OXYGEN SATURATION: 100 % | WEIGHT: 164 LBS | TEMPERATURE: 98.5 F | DIASTOLIC BLOOD PRESSURE: 94 MMHG | BODY MASS INDEX: 28.15 KG/M2 | HEART RATE: 66 BPM | RESPIRATION RATE: 13 BRPM | SYSTOLIC BLOOD PRESSURE: 157 MMHG

## 2025-05-12 DIAGNOSIS — S09.90XA INJURY OF HEAD, INITIAL ENCOUNTER: ICD-10-CM
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
## 2025-05-12 PROCEDURE — 71045 X-RAY EXAM CHEST 1 VIEW: CPT

## 2025-05-12 PROCEDURE — 99284 EMERGENCY DEPT VISIT MOD MDM: CPT | Performed by: EMERGENCY MEDICINE

## 2025-05-12 PROCEDURE — 70450 CT HEAD/BRAIN W/O DYE: CPT

## 2025-05-12 PROCEDURE — 93005 ELECTROCARDIOGRAM TRACING: CPT

## 2025-05-12 PROCEDURE — 99284 EMERGENCY DEPT VISIT MOD MDM: CPT

## 2025-05-12 PROCEDURE — 72125 CT NECK SPINE W/O DYE: CPT

## 2025-05-12 NOTE — DISCHARGE INSTRUCTIONS
Please follow-up with comprehensive concussion clinic for further evaluation and management of your symptoms.

## 2025-05-12 NOTE — ED PROVIDER NOTES
Emergency Department Trauma Note  Willy Talavera 59 y.o. male MRN: [id number]  Unit/Bed#: ED 02/ED 02 Encounter: [id number]      Trauma Alert: Trauma Acuity: Trauma Evaluation  Model of Arrival:   via    Trauma Team: Current Providers  Attending Provider: Magno Foy MD  Registered Nurse: Pedro Pablo Lazo RN  Resident: Larry Matos MD  Consultants:     None      History of Present Illness     Chief Complaint:   Chief Complaint   Patient presents with    Head Injury     HPI:  Willy Talavera is a 59 y.o. male who presents with mechanical fall with head strike, no loss consciousness, no blood thinners.  Fall occurred approximately 1 PM yesterday afternoon.  Patient has been ambulating, behaving normally since then.  Denies any nausea or vomiting.  Presents to the ER today due to concerns of continued head pain and generalized fatigue.  Patient does endorse generalized numbness in all 4 extremities but no appreciable sensory deficits on exam, able to move all 4 extremities 5 of 5 strength.  Patient is small abrasion to the top of his scalp but otherwise no other injuries no other complaints.  Mechanism:Details of Incident: pt hittop of  head          HPI  Review of Systems   Constitutional: Negative.    HENT: Negative.     Eyes: Negative.    Respiratory: Negative.     Cardiovascular: Negative.    Gastrointestinal: Negative.    Endocrine: Negative.    Genitourinary: Negative.    Musculoskeletal:         Fall with head strike   Allergic/Immunologic: Negative.    Neurological:  Positive for weakness and numbness.   Hematological: Negative.    Psychiatric/Behavioral: Negative.         Historical Information     Immunizations: There is no immunization history for the selected administration types on file for this patient.    Past Medical History:   Diagnosis Date    Allergic January 2023    seasonal    Arthritis     Depression     Kidney stone     Testicular torsion     history    Visual impairment     corrective  lenses       Family History   Problem Relation Age of Onset    No Known Problems Mother     Heart disease Father     Alcohol abuse Sister     Alcohol abuse Brother     Alcohol abuse Sister     Alcohol abuse Brother     Drug abuse Brother     Substance Abuse Brother     Completed Suicide  Brother     Suicide Attempts Brother     Cancer Maternal Aunt      Past Surgical History:   Procedure Laterality Date    FRACTURE SURGERY Left 01/23/2018    calcaneus fracture    HAND SURGERY Left     to remove glass piece    KIDNEY STONE SURGERY      twice     Social History     Tobacco Use    Smoking status: Never    Smokeless tobacco: Never   Vaping Use    Vaping status: Never Used   Substance Use Topics    Alcohol use: Not Currently    Drug use: Not Currently     E-Cigarette/Vaping    E-Cigarette Use Never User      E-Cigarette/Vaping Substances    Nicotine No     THC No     CBD No     Flavoring No     Other No     Unknown No        Family History: non-contributory    Meds/Allergies   Prior to Admission Medications   Prescriptions Last Dose Informant Patient Reported? Taking?   ketoconazole (NIZORAL) 2 % cream   Yes No   Sig: Apply topically if needed      Facility-Administered Medications: None       No Known Allergies    PHYSICAL EXAM    PE limited by: N/A    Objective   Vitals:   First set: Temperature: 98.5 °F (36.9 °C) (05/12/25 1132)  Pulse: 72 (05/12/25 1132)  Respirations: 18 (05/12/25 1132)  Blood Pressure: 157/94 (05/12/25 1132)  SpO2: 100 % (05/12/25 1132)    Primary Survey:   (A) Airway: Patent  (B) Breathing: Bilateral breath sounds  (C) Circulation: Pulses:   normal  (D) Disabliity:  GCS Total:  15  (E) Expose:  Completed    Secondary Survey: (Click on Physical Exam tab above)  Physical Exam  Vitals and nursing note reviewed.   Constitutional:       Appearance: Normal appearance. He is normal weight.   HENT:      Head: Normocephalic and atraumatic.      Right Ear: Tympanic membrane, ear canal and external ear  normal.      Left Ear: Tympanic membrane, ear canal and external ear normal.      Nose: Nose normal.      Mouth/Throat:      Mouth: Mucous membranes are moist.      Pharynx: Oropharynx is clear.   Eyes:      Extraocular Movements: Extraocular movements intact.      Conjunctiva/sclera: Conjunctivae normal.      Pupils: Pupils are equal, round, and reactive to light.   Cardiovascular:      Rate and Rhythm: Normal rate and regular rhythm.      Pulses: Normal pulses.      Heart sounds: Normal heart sounds.   Pulmonary:      Effort: Pulmonary effort is normal.      Breath sounds: Normal breath sounds.   Abdominal:      General: Abdomen is flat. Bowel sounds are normal.      Palpations: Abdomen is soft.   Musculoskeletal:         General: Normal range of motion.      Cervical back: Normal range of motion and neck supple.   Skin:     General: Skin is warm and dry.      Capillary Refill: Capillary refill takes less than 2 seconds.   Neurological:      General: No focal deficit present.      Mental Status: He is alert and oriented to person, place, and time.      Cranial Nerves: No cranial nerve deficit.      Sensory: No sensory deficit.      Motor: No weakness.      Coordination: Coordination normal.      Gait: Gait normal.      Deep Tendon Reflexes: Reflexes normal.   Psychiatric:         Mood and Affect: Mood normal.         Behavior: Behavior normal.         Thought Content: Thought content normal.         Judgment: Judgment normal.         Cervical spine cleared by clinical criteria? Yes     Invasive Devices       Peripheral Intravenous Line  Duration             Peripheral IV 05/12/25 Right Antecubital <1 day                    Lab Results:   Results Reviewed       None                   Imaging Studies:   Direct to CT: Yes  XR chest 1 view portable    (Results Pending)   TRAUMA - CT head wo contrast    (Results Pending)   TRAUMA - CT spine cervical wo contrast    (Results Pending)   XR Trauma chest portable     (Results Pending)         Procedures  ECG 12 Lead Documentation Only    Date/Time: 5/12/2025 11:53 AM    Performed by: Larry Matos MD  Authorized by: Larry Matos MD    Indications / Diagnosis:  Fall  ECG reviewed by me, the ED Provider: yes    Patient location:  ED  Interpretation:     Interpretation: normal    Rate:     ECG rate assessment: normal    Rhythm:     Rhythm: sinus rhythm    Ectopy:     Ectopy: none    QRS:     QRS axis:  Normal    QRS intervals:  Normal  Conduction:     Conduction: normal    ST segments:     ST segments:  Normal  T waves:     T waves: normal             ED Course           Medical Decision Making  Patient is 59-year-old male presenting for concerns of mechanical fall with head strike.  DDx: Scalp abrasion, head contusion, concussion, rule out ICH, rule out C-spine injury  Based on patient presentation and physical exam findings, prior concerns for scalp abrasion, head contusion.  Will rule out C-spine injury and intracranial abnormality with CT head and C-spine.  Chest x-ray obtained as well.  EKG obtained, unremarkable.  Dispo pending CT imaging.    Amount and/or Complexity of Data Reviewed  Radiology: ordered and independent interpretation performed.                Disposition  [facility] Transfer: No  Final diagnoses:   None     ED Disposition       None          Follow-up Information    None       Patient's Medications   Discharge Prescriptions    No medications on file     No discharge procedures on file.    PDMP Review       None            ED Provider  Electronically Signed by           Larry Matos MD  05/12/25 [id number]       Larry Matos MD  05/12/25 [id number]

## 2025-05-12 NOTE — Clinical Note
Willy Talavera was seen and treated in our emergency department on 5/12/2025.                Diagnosis:     Willy  may return to work on return date.    He may return on this date: 05/14/2025         If you have any questions or concerns, please don't hesitate to call.      Larry Matos MD    ______________________________           _______________          _______________  Hospital Representative                              Date                                Time

## 2025-05-12 NOTE — ED ATTENDING ATTESTATION
5/12/2025  I, Magno Foy MD, saw and evaluated the patient. I have discussed the patient with the resident/non-physician practitioner and agree with the resident's/non-physician practitioner's findings, Plan of Care, and MDM as documented in the resident's/non-physician practitioner's note, except where noted. All available labs and Radiology studies were reviewed.  I was present for key portions of any procedure(s) performed by the resident/non-physician practitioner and I was immediately available to provide assistance.       At this point I agree with the current assessment done in the Emergency Department.  I have conducted an independent evaluation of this patient a history and physical is as follows:    Patient with head trauma and subsequent fall yesterday.  Abrasion to the right parietal scalp noted.  He endorses a moderate headache along with some paresthesias in all 4 extremities to me.  Benign neurologic exam with strength 5 out of 5 and sensation intact throughout.  Patient without any vomiting and otherwise GCS 15.    CT scans reviewed and negative.  Advised follow-up care for concussions with strict return precautions.    ED Course         Critical Care Time  Procedures

## 2025-05-13 LAB
ATRIAL RATE: 75 BPM
P AXIS: 57 DEGREES
PR INTERVAL: 148 MS
QRS AXIS: 75 DEGREES
QRSD INTERVAL: 100 MS
QT INTERVAL: 398 MS
QTC INTERVAL: 444 MS
T WAVE AXIS: 32 DEGREES
VENTRICULAR RATE: 75 BPM

## 2025-05-13 PROCEDURE — 93010 ELECTROCARDIOGRAM REPORT: CPT | Performed by: INTERNAL MEDICINE

## 2025-06-21 NOTE — PROGRESS NOTES
Problem: Pain  Goal: Acceptable pain level achieved/maintained at rest using appropriate pain scale for the patient  Outcome: Monitoring/Evaluating progress  Goal: Acceptable pain level achieved/maintained with activity using appropriate pain scale for the patient  Outcome: Monitoring/Evaluating progress  Goal: Acceptable pain level achieved/maintained without oversedation  Outcome: Monitoring/Evaluating progress     Problem: At Risk for Falls  Goal: Patient does not fall  Outcome: Monitoring/Evaluating progress  Goal: Patient takes action to control fall-related risks  Outcome: Monitoring/Evaluating progress     Problem: At Risk for Injury Due to Fall  Goal: Patient does not fall  Outcome: Monitoring/Evaluating progress  Goal: Takes action to control condition specific risks  Outcome: Monitoring/Evaluating progress  Goal: Verbalizes understanding of fall-related injury personal risks  Description: Document education using the patient education activity  Outcome: Monitoring/Evaluating progress      [id number] [facility]    NAME: Cherrie Chisholm  AGE: 60 y.o. SEX: male  : 1965     DATE: 2023     Assessment and Plan:     Problem List Items Addressed This Visit     Overweight with body mass index (BMI) of 26 to 26.9 in adult    Influenza vaccination declined   Other Visit Diagnoses     Annual physical exam    -  Primary    Depression screening negative        Screening for thyroid disorder        Relevant Orders    TSH, 3rd generation with Free T4 reflex    Screening for deficiency anemia        Relevant Orders    CBC and differential    Screening for diabetes mellitus        Relevant Orders    Comprehensive metabolic panel    Screening for lipid disorders        Relevant Orders    Lipid Panel with Direct LDL reflex    Screening for malignant neoplasm of prostate        Relevant Orders    PSA, Total Screen          Immunizations and preventive care screenings were discussed with patient today. Appropriate education was printed on patient's after visit summary. Counseling:  Alcohol/drug use: discussed moderation in alcohol intake, the recommendations for healthy alcohol use, and avoidance of illicit drug use. Dental Health: discussed importance of regular tooth brushing, flossing, and dental visits. Injury prevention: discussed safety/seat belts, safety helmets, smoke detectors, carbon dioxide detectors, and smoking near bedding or upholstery. Sexual health: discussed sexually transmitted diseases, partner selection, use of condoms, avoidance of unintended pregnancy, and contraceptive alternatives. Exercise: the importance of regular exercise/physical activity was discussed. Recommend exercise 3-5 times per week for at least 30 minutes. BMI Counseling: Body mass index is 27.46 kg/m².  The BMI is above normal. Nutrition recommendations include decreasing portion sizes, encouraging healthy choices of fruits and vegetables, decreasing fast food intake, consuming healthier snacks, limiting drinks that contain sugar, moderation in carbohydrate intake, increasing intake of lean protein, reducing intake of saturated and trans fat and reducing intake of cholesterol. Exercise recommendations include exercising 3-5 times per week. No pharmacotherapy was ordered. Rationale for BMI follow-up plan is due to patient being overweight or obese. Depression Screening and Follow-up Plan: Patient was screened for depression during today's encounter. They screened negative with a PHQ-2 score of 0. Return in about 1 year (around 9/21/2024) for Annual Physical.     Chief Complaint:     Chief Complaint   Patient presents with   • Annual Exam     Not interested in the flu shot. History of Present Illness:     Adult Annual Physical   Patient here for a comprehensive physical exam. The patient reports no problems. Diet and Physical Activity  Diet/Nutrition: well balanced diet, limited junk food and consuming 3-5 servings of fruits/vegetables daily. Exercise: laborous job at [employer] Depression Screening  PHQ-2/9 Depression Screening    Little interest or pleasure in doing things: 0 - not at all  Feeling down, depressed, or hopeless: 0 - not at all  PHQ-2 Score: 0  PHQ-2 Interpretation: Negative depression screen       General Health  Sleep: gets 7-8 hours of sleep on average. Hearing: normal - bilateral.  Vision: no vision problems, most recent eye exam <1 year ago and wears glasses. Dental: no dental visits for >1 year, brushes teeth twice daily and flosses teeth occasionally.  Health  Symptoms include: none     Review of Systems:     Review of Systems   All other systems reviewed and are negative.      Past Medical History:     Past Medical History:   Diagnosis Date   • Allergic January 2023    seasonal   • Arthritis    • Depression    • Kidney stone    • Testicular torsion     history   • Visual impairment corrective lenses      Past Surgical History:     Past Surgical History:   Procedure Laterality Date   • FRACTURE SURGERY Left 01/23/2018    calcaneus fracture   • HAND SURGERY Left     to remove glass piece   • KIDNEY STONE SURGERY      twice      Family History:     Family History   Problem Relation Age of Onset   • No Known Problems Mother    • Heart disease Father    • Alcohol abuse Sister    • Alcohol abuse Brother    • Alcohol abuse Sister    • Alcohol abuse Brother    • Drug abuse Brother    • Substance Abuse Brother    • Completed Suicide  Brother    • Suicide Attempts Brother    • Cancer Maternal Aunt       Social History:     Social History     Socioeconomic History   • Marital status: /Civil Union     Spouse name: None   • Number of children: None   • Years of education: None   • Highest education level: None   Occupational History   • None   Tobacco Use   • Smoking status: Never   • Smokeless tobacco: Never   Vaping Use   • Vaping Use: Never used   Substance and Sexual Activity   • Alcohol use: Not Currently   • Drug use: Not Currently   • Sexual activity: Not Currently     Partners: Female     Birth control/protection: Abstinence, Condom Male   Other Topics Concern   • None   Social History Narrative   • None     Social Determinants of Health     Financial Resource Strain: Not on file   Food Insecurity: Not on file   Transportation Needs: Not on file   Physical Activity: Not on file   Stress: Not on file   Social Connections: Not on file   Intimate Partner Violence: Not on file   Housing Stability: Not on file      Current Medications:     No current outpatient medications on file. No current facility-administered medications for this visit. Allergies:     No Known Allergies   Physical Exam:     /86   Pulse 60   Temp 98.6 °F (37 °C) (Tympanic)   Ht 5' 4" (1.626 m)   Wt 72.6 kg (160 lb)   SpO2 100%   BMI 27.46 kg/m²     Physical Exam  Vitals and nursing note reviewed. Constitutional:       Appearance: Normal appearance. He is well-developed. HENT:      Head: Normocephalic and atraumatic. Right Ear: External ear normal.      Left Ear: External ear normal.      Nose: Nose normal.   Eyes:      Conjunctiva/sclera: Conjunctivae normal.      Pupils: Pupils are equal, round, and reactive to light. Cardiovascular:      Rate and Rhythm: Normal rate and regular rhythm. Heart sounds: Normal heart sounds. Pulmonary:      Effort: Pulmonary effort is normal.      Breath sounds: Normal breath sounds. Abdominal:      General: Bowel sounds are normal.      Palpations: Abdomen is soft. Genitourinary:     Comments: Deferred  Musculoskeletal:         General: Normal range of motion. Cervical back: Normal range of motion and neck supple. Skin:     General: Skin is warm and dry. Capillary Refill: Capillary refill takes less than 2 seconds. Neurological:      Mental Status: He is alert and oriented to person, place, and time. Psychiatric:         Behavior: Behavior normal.         Thought Content:  Thought content normal.         Judgment: Judgment normal.            Appointment on 09/19/2023   Component Date Value Ref Range Status   • WBC 09/19/2023 6.85  4.31 - 10.16 Thousand/uL Final   • RBC 09/19/2023 4.62  3.88 - 5.62 Million/uL Final   • Hemoglobin 09/19/2023 14.9  12.0 - 17.0 g/dL Final   • Hematocrit 09/19/2023 45.5  36.5 - 49.3 % Final   • MCV 09/19/2023 99 (H)  82 - 98 fL Final   • MCH 09/19/2023 32.3  26.8 - 34.3 pg Final   • MCHC 09/19/2023 32.7  31.4 - 37.4 g/dL Final   • RDW 09/19/2023 12.5  11.6 - 15.1 % Final   • MPV 09/19/2023 9.7  8.9 - 12.7 fL Final   • Platelets 45/29/1719 294  149 - 390 Thousands/uL Final   • nRBC 09/19/2023 0  /100 WBCs Final   • Neutrophils Relative 09/19/2023 39 (L)  43 - 75 % Final   • Immat GRANS % 09/19/2023 0  0 - 2 % Final   • Lymphocytes Relative 09/19/2023 45 (H)  14 - 44 % Final   • Monocytes Relative 09/19/2023 9  4 - 12 % Final   • Eosinophils Relative 09/19/2023 6  0 - 6 % Final   • Basophils Relative 09/19/2023 1  0 - 1 % Final   • Neutrophils Absolute 09/19/2023 2.69  1.85 - 7.62 Thousands/µL Final   • Immature Grans Absolute 09/19/2023 0.03  0.00 - 0.20 Thousand/uL Final   • Lymphocytes Absolute 09/19/2023 3.02  0.60 - 4.47 Thousands/µL Final   • Monocytes Absolute 09/19/2023 0.64  0.17 - 1.22 Thousand/µL Final   • Eosinophils Absolute 09/19/2023 0.42  0.00 - 0.61 Thousand/µL Final   • Basophils Absolute 09/19/2023 0.05  0.00 - 0.10 Thousands/µL Final   • Cholesterol 09/19/2023 185  See Comment mg/dL Final    Cholesterol:         Pediatric <18 Years        Desirable          <170 mg/dL      Borderline High    170-199 mg/dL      High               >=200 mg/dL        Adult >=18 Years            Desirable         <200 mg/dL      Borderline High   200-239 mg/dL      High              >239 mg/dL     • Triglycerides 09/19/2023 110  See Comment mg/dL Final    Triglyceride:     0-9Y            <75mg/dL     10Y-17Y         <90 mg/dL       >=18Y     Normal          <150 mg/dL     Borderline High 150-199 mg/dL     High            200-499 mg/dL        Very High       >499 mg/dL    Specimen collection should occur prior to Metamizole administration due to the potential for falsely depressed results. • HDL, Direct 09/19/2023 39 (L)  >=40 mg/dL Final   • LDL Calculated 09/19/2023 124 (H)  0 - 100 mg/dL Final    LDL Cholesterol:     Optimal           <100 mg/dl     Near Optimal      100-129 mg/dl     Above Optimal       Borderline High 130-159 mg/dl       High            160-189 mg/dl       Very High       >189 mg/dl         This screening LDL is a calculated result. It does not have the accuracy of the Direct Measured LDL in the monitoring of patients with hyperlipidemia and/or statin therapy. Direct Measure LDL (WVS225) must be ordered separately in these patients.    • Sodium 09/19/2023 140  135 - 147 mmol/L Final   • Potassium 09/19/2023 4.4  3.5 - 5.3 mmol/L Final   • Chloride 09/19/2023 104  96 - 108 mmol/L Final   • CO2 09/19/2023 27  21 - 32 mmol/L Final   • ANION GAP 09/19/2023 9  mmol/L Final   • BUN 09/19/2023 13  5 - 25 mg/dL Final   • Creatinine 09/19/2023 0.91  0.60 - 1.30 mg/dL Final    Standardized to IDMS reference method   • Glucose, Fasting 09/19/2023 82  65 - 99 mg/dL Final   • Calcium 09/19/2023 10.0  8.4 - 10.2 mg/dL Final   • AST 09/19/2023 29  13 - 39 U/L Final   • ALT 09/19/2023 47  7 - 52 U/L Final    Specimen collection should occur prior to Sulfasalazine administration due to the potential for falsely depressed results. • Alkaline Phosphatase 09/19/2023 75  34 - 104 U/L Final   • Total Protein 09/19/2023 7.1  6.4 - 8.4 g/dL Final   • Albumin 09/19/2023 4.4  3.5 - 5.0 g/dL Final   • Total Bilirubin 09/19/2023 0.68  0.20 - 1.00 mg/dL Final    Use of this assay is not recommended for patients undergoing treatment with eltrombopag due to the potential for falsely elevated results. N-acetyl-p-benzoquinone imine (metabolite of Acetaminophen) will generate erroneously low results in samples for patients that have taken an overdose of Acetaminophen. • eGFR 09/19/2023 91  ml/min/1.73sq m Final   • TSH 3RD GENERATON 09/19/2023 3.453  0.450 - 4.500 uIU/mL Final    Adult TSH (3rd generation) reference range follows the recommended guidelines of the [employer], January, 2020. I have reviewed all the lab results. There are some abnormalities that are not critical to the patient's health, I have discussed these in person at this office appointment.     Christiano Dave, [id number] [facility]